# Patient Record
Sex: FEMALE | Race: WHITE | NOT HISPANIC OR LATINO | Employment: UNEMPLOYED | URBAN - NONMETROPOLITAN AREA
[De-identification: names, ages, dates, MRNs, and addresses within clinical notes are randomized per-mention and may not be internally consistent; named-entity substitution may affect disease eponyms.]

---
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
## 2024-09-23 ENCOUNTER — HOSPITAL ENCOUNTER (EMERGENCY)
Facility: HOSPITAL | Age: 19
Discharge: HOME | End: 2024-09-23
Attending: EMERGENCY MEDICINE | Admitting: EMERGENCY MEDICINE
Payer: COMMERCIAL

## 2024-09-23 ENCOUNTER — APPOINTMENT (OUTPATIENT)
Dept: GENERAL RADIOLOGY | Facility: HOSPITAL | Age: 19
End: 2024-09-23
Payer: COMMERCIAL

## 2024-09-23 VITALS
HEART RATE: 67 BPM | DIASTOLIC BLOOD PRESSURE: 77 MMHG | TEMPERATURE: 98 F | HEIGHT: 66 IN | SYSTOLIC BLOOD PRESSURE: 113 MMHG | OXYGEN SATURATION: 99 % | WEIGHT: 130 LBS | BODY MASS INDEX: 20.89 KG/M2 | RESPIRATION RATE: 14 BRPM

## 2024-09-23 DIAGNOSIS — R07.9 CHEST PAIN, UNSPECIFIED TYPE: Primary | ICD-10-CM

## 2024-09-23 LAB
ALBUMIN SERPL-MCNC: 4.7 G/DL (ref 3.5–5.2)
ALBUMIN/GLOB SERPL: 1.6 G/DL
ALP SERPL-CCNC: 107 U/L (ref 39–117)
ALT SERPL W P-5'-P-CCNC: 10 U/L (ref 1–33)
ANION GAP SERPL CALCULATED.3IONS-SCNC: 9.3 MMOL/L (ref 5–15)
AST SERPL-CCNC: 21 U/L (ref 1–32)
B-HCG UR QL: NEGATIVE
BASOPHILS # BLD AUTO: 0.09 10*3/MM3 (ref 0–0.2)
BASOPHILS NFR BLD AUTO: 0.9 % (ref 0–1.5)
BILIRUB SERPL-MCNC: 0.2 MG/DL (ref 0–1.2)
BILIRUB UR QL STRIP: NEGATIVE
BUN SERPL-MCNC: 13 MG/DL (ref 6–20)
BUN/CREAT SERPL: 17.3 (ref 7–25)
CALCIUM SPEC-SCNC: 9.5 MG/DL (ref 8.6–10.5)
CHLORIDE SERPL-SCNC: 101 MMOL/L (ref 98–107)
CLARITY UR: CLEAR
CO2 SERPL-SCNC: 28.7 MMOL/L (ref 22–29)
COLOR UR: YELLOW
CREAT SERPL-MCNC: 0.75 MG/DL (ref 0.57–1)
D DIMER PPP FEU-MCNC: <0.27 MCGFEU/ML (ref 0–0.5)
DEPRECATED RDW RBC AUTO: 39.9 FL (ref 37–54)
EGFRCR SERPLBLD CKD-EPI 2021: 117.1 ML/MIN/1.73
EOSINOPHIL # BLD AUTO: 0.06 10*3/MM3 (ref 0–0.4)
EOSINOPHIL NFR BLD AUTO: 0.6 % (ref 0.3–6.2)
ERYTHROCYTE [DISTWIDTH] IN BLOOD BY AUTOMATED COUNT: 12.3 % (ref 12.3–15.4)
GLOBULIN UR ELPH-MCNC: 2.9 GM/DL
GLUCOSE SERPL-MCNC: 142 MG/DL (ref 65–99)
GLUCOSE UR STRIP-MCNC: NEGATIVE MG/DL
HCT VFR BLD AUTO: 40.7 % (ref 34–46.6)
HGB BLD-MCNC: 13.7 G/DL (ref 12–15.9)
HGB UR QL STRIP.AUTO: NEGATIVE
IMM GRANULOCYTES # BLD AUTO: 0.03 10*3/MM3 (ref 0–0.05)
IMM GRANULOCYTES NFR BLD AUTO: 0.3 % (ref 0–0.5)
KETONES UR QL STRIP: NEGATIVE
LEUKOCYTE ESTERASE UR QL STRIP.AUTO: NEGATIVE
LYMPHOCYTES # BLD AUTO: 3.54 10*3/MM3 (ref 0.7–3.1)
LYMPHOCYTES NFR BLD AUTO: 35.4 % (ref 19.6–45.3)
MCH RBC QN AUTO: 29.7 PG (ref 26.6–33)
MCHC RBC AUTO-ENTMCNC: 33.7 G/DL (ref 31.5–35.7)
MCV RBC AUTO: 88.1 FL (ref 79–97)
MONOCYTES # BLD AUTO: 0.65 10*3/MM3 (ref 0.1–0.9)
MONOCYTES NFR BLD AUTO: 6.5 % (ref 5–12)
NEUTROPHILS NFR BLD AUTO: 5.64 10*3/MM3 (ref 1.7–7)
NEUTROPHILS NFR BLD AUTO: 56.3 % (ref 42.7–76)
NITRITE UR QL STRIP: NEGATIVE
NRBC BLD AUTO-RTO: 0 /100 WBC (ref 0–0.2)
PH UR STRIP.AUTO: 6.5 [PH] (ref 5–8)
PLATELET # BLD AUTO: 325 10*3/MM3 (ref 140–450)
PMV BLD AUTO: 10.1 FL (ref 6–12)
POTASSIUM SERPL-SCNC: 3.8 MMOL/L (ref 3.5–5.2)
PROT SERPL-MCNC: 7.6 G/DL (ref 6–8.5)
PROT UR QL STRIP: NEGATIVE
RBC # BLD AUTO: 4.62 10*6/MM3 (ref 3.77–5.28)
SODIUM SERPL-SCNC: 139 MMOL/L (ref 136–145)
SP GR UR STRIP: 1.02 (ref 1–1.03)
TROPONIN T SERPL HS-MCNC: <6 NG/L
UROBILINOGEN UR QL STRIP: NORMAL
WBC NRBC COR # BLD AUTO: 10.01 10*3/MM3 (ref 3.4–10.8)

## 2024-09-23 PROCEDURE — 96374 THER/PROPH/DIAG INJ IV PUSH: CPT

## 2024-09-23 PROCEDURE — 85379 FIBRIN DEGRADATION QUANT: CPT | Performed by: PHYSICIAN ASSISTANT

## 2024-09-23 PROCEDURE — 80053 COMPREHEN METABOLIC PANEL: CPT | Performed by: PHYSICIAN ASSISTANT

## 2024-09-23 PROCEDURE — 93005 ELECTROCARDIOGRAM TRACING: CPT | Performed by: PHYSICIAN ASSISTANT

## 2024-09-23 PROCEDURE — 84484 ASSAY OF TROPONIN QUANT: CPT | Performed by: PHYSICIAN ASSISTANT

## 2024-09-23 PROCEDURE — 25010000002 KETOROLAC TROMETHAMINE PER 15 MG: Performed by: PHYSICIAN ASSISTANT

## 2024-09-23 PROCEDURE — 81025 URINE PREGNANCY TEST: CPT | Performed by: PHYSICIAN ASSISTANT

## 2024-09-23 PROCEDURE — 81003 URINALYSIS AUTO W/O SCOPE: CPT | Performed by: PHYSICIAN ASSISTANT

## 2024-09-23 PROCEDURE — 99284 EMERGENCY DEPT VISIT MOD MDM: CPT

## 2024-09-23 PROCEDURE — 85025 COMPLETE CBC W/AUTO DIFF WBC: CPT | Performed by: PHYSICIAN ASSISTANT

## 2024-09-23 PROCEDURE — 71045 X-RAY EXAM CHEST 1 VIEW: CPT

## 2024-09-23 RX ORDER — KETOROLAC TROMETHAMINE 30 MG/ML
30 INJECTION, SOLUTION INTRAMUSCULAR; INTRAVENOUS ONCE
Status: COMPLETED | OUTPATIENT
Start: 2024-09-23 | End: 2024-09-23

## 2024-09-23 RX ORDER — SODIUM CHLORIDE 0.9 % (FLUSH) 0.9 %
10 SYRINGE (ML) INJECTION AS NEEDED
Status: DISCONTINUED | OUTPATIENT
Start: 2024-09-23 | End: 2024-09-23 | Stop reason: HOSPADM

## 2024-09-23 RX ADMIN — KETOROLAC TROMETHAMINE 30 MG: 30 INJECTION, SOLUTION INTRAMUSCULAR; INTRAVENOUS at 18:39

## 2025-02-22 ENCOUNTER — HOSPITAL ENCOUNTER (OUTPATIENT)
Facility: HOSPITAL | Age: 20
Discharge: HOME | End: 2025-02-22
Payer: COMMERCIAL

## 2025-02-22 ENCOUNTER — OFFICE VISIT (OUTPATIENT)
Age: 20
End: 2025-02-22
Payer: COMMERCIAL

## 2025-02-22 VITALS
SYSTOLIC BLOOD PRESSURE: 113 MMHG | HEART RATE: 105 BPM | HEIGHT: 66 IN | OXYGEN SATURATION: 98 % | DIASTOLIC BLOOD PRESSURE: 79 MMHG | WEIGHT: 126.8 LBS | BODY MASS INDEX: 20.38 KG/M2

## 2025-02-22 DIAGNOSIS — R11.0 NAUSEA: Primary | ICD-10-CM

## 2025-02-22 DIAGNOSIS — R68.89 INTOLERANCE TO COLD: ICD-10-CM

## 2025-02-22 DIAGNOSIS — R11.0 NAUSEA: ICD-10-CM

## 2025-02-22 DIAGNOSIS — R10.11 RUQ PAIN: ICD-10-CM

## 2025-02-22 LAB
ALBUMIN SERPL-MCNC: 4.5 G/DL (ref 3.4–4.8)
ALBUMIN/GLOB SERPL: 1.2 {RATIO} (ref 0.8–2)
ALP SERPL-CCNC: 435 U/L (ref 25–100)
ALT SERPL-CCNC: 747 U/L (ref 4–36)
ANION GAP SERPL CALCULATED.3IONS-SCNC: 12 MMOL/L (ref 3–16)
AST SERPL-CCNC: 496 U/L (ref 8–33)
BASOPHILS # BLD: 0.1 K/UL (ref 0–0.1)
BASOPHILS NFR BLD: 1.6 %
BILIRUB DIRECT SERPL-MCNC: 1.3 MG/DL (ref 0–0.2)
BILIRUB INDIRECT SERPL-MCNC: 0.6 MG/DL (ref 0.2–0.8)
BILIRUB SERPL-MCNC: 1.9 MG/DL (ref 0.3–1.2)
BILIRUBIN, POC: ABNORMAL
BLOOD URINE, POC: ABNORMAL
BUN SERPL-MCNC: 11 MG/DL (ref 6–20)
CALCIUM SERPL-MCNC: 10.1 MG/DL (ref 8.3–10.6)
CHLORIDE SERPL-SCNC: 101 MMOL/L (ref 98–107)
CLARITY, POC: ABNORMAL
CO2 SERPL-SCNC: 27 MMOL/L (ref 20–30)
COLOR, POC: ABNORMAL
CREAT SERPL-MCNC: 0.8 MG/DL (ref 0.4–1.2)
EOSINOPHIL # BLD: 0 K/UL (ref 0–0.4)
EOSINOPHIL NFR BLD: 0.3 %
ERYTHROCYTE [DISTWIDTH] IN BLOOD BY AUTOMATED COUNT: 13.2 % (ref 11–16)
ERYTHROCYTE [SEDIMENTATION RATE] IN BLOOD BY WESTERGREN METHOD: 4 MM/HR (ref 0–20)
GFR SERPLBLD CREATININE-BSD FMLA CKD-EPI: >90 ML/MIN/{1.73_M2}
GLOBULIN SER CALC-MCNC: 3.7 G/DL
GLUCOSE SERPL-MCNC: 126 MG/DL (ref 74–106)
GLUCOSE URINE, POC: ABNORMAL MG/DL
HCT VFR BLD AUTO: 46.1 % (ref 37–47)
HGB BLD-MCNC: 14.8 G/DL (ref 11.5–16.5)
IMM GRANULOCYTES # BLD: 0 K/UL
IMM GRANULOCYTES NFR BLD: 0.2 % (ref 0–5)
IRON SATN MFR SERPL: 37 % (ref 15–50)
IRON SERPL-MCNC: 126 UG/DL (ref 37–145)
KETONES, POC: ABNORMAL MG/DL
LEUKOCYTE EST, POC: ABNORMAL
LYMPHOCYTES # BLD: 6.2 K/UL (ref 1.5–4)
LYMPHOCYTES NFR BLD: 69.8 %
MCH RBC QN AUTO: 28.4 PG (ref 27–32)
MCHC RBC AUTO-ENTMCNC: 32.1 G/DL (ref 31–35)
MCV RBC AUTO: 88.3 FL (ref 80–100)
MONOCYTES # BLD: 0.5 K/UL (ref 0.2–0.8)
MONOCYTES NFR BLD: 5.9 %
NEUTROPHILS # BLD: 2 K/UL (ref 2–7.5)
NEUTS SEG NFR BLD: 22.2 %
NITRITE, POC: ABNORMAL
PH, POC: 6
PLATELET # BLD AUTO: 173 K/UL (ref 150–400)
PMV BLD AUTO: 10.3 FL (ref 6–10)
POTASSIUM SERPL-SCNC: 3.9 MMOL/L (ref 3.4–5.1)
PROT SERPL-MCNC: 8.2 G/DL (ref 6.4–8.3)
PROTEIN, POC: ABNORMAL MG/DL
RBC # BLD AUTO: 5.22 M/UL (ref 3.8–5.8)
SODIUM SERPL-SCNC: 140 MMOL/L (ref 136–145)
SPECIFIC GRAVITY, POC: 1.01
T4 FREE SERPL-MCNC: 1.3 NG/DL (ref 0.92–1.68)
TIBC SERPL-MCNC: 342 UG/DL (ref 250–450)
TSH SERPL DL<=0.005 MIU/L-ACNC: 0.95 UIU/ML (ref 0.27–4.2)
UROBILINOGEN, POC: ABNORMAL MG/DL
WBC # BLD AUTO: 8.9 K/UL (ref 4–11)

## 2025-02-22 PROCEDURE — 83540 ASSAY OF IRON: CPT

## 2025-02-22 PROCEDURE — 85652 RBC SED RATE AUTOMATED: CPT

## 2025-02-22 PROCEDURE — 81002 URINALYSIS NONAUTO W/O SCOPE: CPT

## 2025-02-22 PROCEDURE — 36415 COLL VENOUS BLD VENIPUNCTURE: CPT

## 2025-02-22 PROCEDURE — 84439 ASSAY OF FREE THYROXINE: CPT

## 2025-02-22 PROCEDURE — 85025 COMPLETE CBC W/AUTO DIFF WBC: CPT

## 2025-02-22 PROCEDURE — 84443 ASSAY THYROID STIM HORMONE: CPT

## 2025-02-22 PROCEDURE — 80053 COMPREHEN METABOLIC PANEL: CPT

## 2025-02-22 PROCEDURE — 83550 IRON BINDING TEST: CPT

## 2025-02-22 PROCEDURE — 82248 BILIRUBIN DIRECT: CPT

## 2025-02-22 ASSESSMENT — ENCOUNTER SYMPTOMS
NAUSEA: 1
DIARRHEA: 0
ABDOMINAL PAIN: 0
CONSTIPATION: 0

## 2025-02-22 ASSESSMENT — PATIENT HEALTH QUESTIONNAIRE - PHQ9: DEPRESSION UNABLE TO ASSESS: URGENT/EMERGENT SITUATION

## 2025-02-22 NOTE — PROGRESS NOTES
No bruising.      Comments: 1 cm cyst on back of head, no drainage.   Neurological:      General: No focal deficit present.      Mental Status: She is alert and oriented to person, place, and time. Mental status is at baseline.   Psychiatric:         Mood and Affect: Mood normal.         Behavior: Behavior normal.           Assessment:      1. Nausea  -     CBC with Auto Differential; Future  -     Comprehensive Metabolic Panel; Future  -     Iron and TIBC; Future  -     Hepatic Function Panel; Future  -     Sedimentation Rate; Future  -     US GALLBLADDER RUQ; Future  -     POCT Urinalysis no Micro  2. Intolerance to cold  -     TSH; Future  -     T4, Free; Future  3. RUQ pain  -     US GALLBLADDER RUQ; Future         Plan:      - Lab orders placed.  - Urine dip in office. Increased Urobili and bilirubin levels in urine.  - Order for RUQ ultrasound.  - Pt is to follow up in 1 week or after ultrasound is completed to review results and lab results.  - Return to office or go to ED if s.s persist or worsen. Pt verbalizes understanding and is in agreement with POC.    Orders Placed This Encounter   Procedures    US GALLBLADDER RUQ     [facility]      Standing Status:   Future     Standing Expiration Date:   2/22/2026    TSH     Standing Status:   Future     Standing Expiration Date:   2/22/2026    T4, Free     Standing Status:   Future     Standing Expiration Date:   2/22/2026    CBC with Auto Differential     Standing Status:   Future     Standing Expiration Date:   2/22/2026    Comprehensive Metabolic Panel     Standing Status:   Future     Standing Expiration Date:   2/22/2026    Iron and TIBC     Standing Status:   Future     Standing Expiration Date:   2/22/2026    Hepatic Function Panel     Standing Status:   Future     Standing Expiration Date:   2/22/2026    Sedimentation Rate     Standing Status:   Future     Standing Expiration Date:   2/22/2026    POCT Urinalysis no Micro

## 2025-02-23 ENCOUNTER — APPOINTMENT (OUTPATIENT)
Dept: CT IMAGING | Facility: HOSPITAL | Age: 20
End: 2025-02-23
Payer: COMMERCIAL

## 2025-02-23 ENCOUNTER — HOSPITAL ENCOUNTER (OUTPATIENT)
Facility: HOSPITAL | Age: 20
Setting detail: OBSERVATION
Discharge: HOME | End: 2025-02-25
Attending: STUDENT IN AN ORGANIZED HEALTH CARE EDUCATION/TRAINING PROGRAM | Admitting: INTERNAL MEDICINE
Payer: COMMERCIAL

## 2025-02-23 DIAGNOSIS — E80.6 HYPERBILIRUBINEMIA: Primary | ICD-10-CM

## 2025-02-23 LAB
ALBUMIN SERPL-MCNC: 4.8 G/DL (ref 3.5–5.2)
ALBUMIN/GLOB SERPL: 1.5 G/DL
ALP SERPL-CCNC: 502 U/L (ref 39–117)
ALT SERPL W P-5'-P-CCNC: 610 U/L (ref 1–33)
ANION GAP SERPL CALCULATED.3IONS-SCNC: 10.8 MMOL/L (ref 5–15)
APAP SERPL-MCNC: <5 MCG/ML (ref 0–30)
AST SERPL-CCNC: 394 U/L (ref 1–32)
BASOPHILS # BLD MANUAL: 0.08 10*3/MM3 (ref 0–0.2)
BASOPHILS NFR BLD MANUAL: 1 % (ref 0–1.5)
BILIRUB SERPL-MCNC: 1.3 MG/DL (ref 0–1.2)
BUN SERPL-MCNC: 7 MG/DL (ref 6–20)
BUN/CREAT SERPL: 8.2 (ref 7–25)
CALCIUM SPEC-SCNC: 9.2 MG/DL (ref 8.6–10.5)
CHLORIDE SERPL-SCNC: 101 MMOL/L (ref 98–107)
CO2 SERPL-SCNC: 28.2 MMOL/L (ref 22–29)
CREAT SERPL-MCNC: 0.85 MG/DL (ref 0.57–1)
DEPRECATED RDW RBC AUTO: 42.7 FL (ref 37–54)
EGFRCR SERPLBLD CKD-EPI 2021: 100.1 ML/MIN/1.73
ERYTHROCYTE [DISTWIDTH] IN BLOOD BY AUTOMATED COUNT: 13.2 % (ref 12.3–15.4)
FLUAV RNA RESP QL NAA+PROBE: NOT DETECTED
FLUBV RNA RESP QL NAA+PROBE: NOT DETECTED
GLOBULIN UR ELPH-MCNC: 3.3 GM/DL
GLUCOSE SERPL-MCNC: 114 MG/DL (ref 65–99)
HCG INTACT+B SERPL-ACNC: <0.1 MIU/ML
HCT VFR BLD AUTO: 44.3 % (ref 34–46.6)
HGB BLD-MCNC: 14.7 G/DL (ref 12–15.9)
HOLD SPECIMEN: NORMAL
HOLD SPECIMEN: NORMAL
INR PPP: 0.96 (ref 0.9–1.1)
LIPASE SERPL-CCNC: 81 U/L (ref 13–60)
LYMPHOCYTES # BLD MANUAL: 7.59 10*3/MM3 (ref 0.7–3.1)
LYMPHOCYTES NFR BLD MANUAL: 6 % (ref 5–12)
MCH RBC QN AUTO: 29.2 PG (ref 26.6–33)
MCHC RBC AUTO-ENTMCNC: 33.2 G/DL (ref 31.5–35.7)
MCV RBC AUTO: 87.9 FL (ref 79–97)
MONOCYTES # BLD: 0.5 10*3/MM3 (ref 0.1–0.9)
NEUTROPHILS # BLD AUTO: 0.08 10*3/MM3 (ref 1.7–7)
NEUTROPHILS NFR BLD MANUAL: 1 % (ref 42.7–76)
PLAT MORPH BLD: NORMAL
PLATELET # BLD AUTO: 209 10*3/MM3 (ref 140–450)
PMV BLD AUTO: 10.3 FL (ref 6–12)
POTASSIUM SERPL-SCNC: 3.7 MMOL/L (ref 3.5–5.2)
PROT SERPL-MCNC: 8.1 G/DL (ref 6–8.5)
PROTHROMBIN TIME: 13.3 SECONDS (ref 12.3–15.1)
RBC # BLD AUTO: 5.04 10*6/MM3 (ref 3.77–5.28)
RBC MORPH BLD: NORMAL
SARS-COV-2 RNA RESP QL NAA+PROBE: NOT DETECTED
SODIUM SERPL-SCNC: 140 MMOL/L (ref 136–145)
VARIANT LYMPHS NFR BLD MANUAL: 24 % (ref 19.6–45.3)
VARIANT LYMPHS NFR BLD MANUAL: 68 % (ref 0–5)
WBC MORPH BLD: NORMAL
WBC NRBC COR # BLD AUTO: 8.25 10*3/MM3 (ref 3.4–10.8)
WHOLE BLOOD HOLD COAG: NORMAL
WHOLE BLOOD HOLD SPECIMEN: NORMAL

## 2025-02-23 PROCEDURE — 85007 BL SMEAR W/DIFF WBC COUNT: CPT | Performed by: NURSE PRACTITIONER

## 2025-02-23 PROCEDURE — G0378 HOSPITAL OBSERVATION PER HR: HCPCS

## 2025-02-23 PROCEDURE — 80074 ACUTE HEPATITIS PANEL: CPT | Performed by: STUDENT IN AN ORGANIZED HEALTH CARE EDUCATION/TRAINING PROGRAM

## 2025-02-23 PROCEDURE — 86665 EPSTEIN-BARR CAPSID VCA: CPT | Performed by: STUDENT IN AN ORGANIZED HEALTH CARE EDUCATION/TRAINING PROGRAM

## 2025-02-23 PROCEDURE — 84702 CHORIONIC GONADOTROPIN TEST: CPT | Performed by: NURSE PRACTITIONER

## 2025-02-23 PROCEDURE — 80143 DRUG ASSAY ACETAMINOPHEN: CPT | Performed by: NURSE PRACTITIONER

## 2025-02-23 PROCEDURE — 83690 ASSAY OF LIPASE: CPT | Performed by: NURSE PRACTITIONER

## 2025-02-23 PROCEDURE — 87636 SARSCOV2 & INF A&B AMP PRB: CPT | Performed by: NURSE PRACTITIONER

## 2025-02-23 PROCEDURE — 85025 COMPLETE CBC W/AUTO DIFF WBC: CPT | Performed by: NURSE PRACTITIONER

## 2025-02-23 PROCEDURE — 74177 CT ABD & PELVIS W/CONTRAST: CPT

## 2025-02-23 PROCEDURE — 86664 EPSTEIN-BARR NUCLEAR ANTIGEN: CPT | Performed by: STUDENT IN AN ORGANIZED HEALTH CARE EDUCATION/TRAINING PROGRAM

## 2025-02-23 PROCEDURE — 25510000001 IOPAMIDOL 61 % SOLUTION: Performed by: STUDENT IN AN ORGANIZED HEALTH CARE EDUCATION/TRAINING PROGRAM

## 2025-02-23 PROCEDURE — 86381 MITOCHONDRIAL ANTIBODY EACH: CPT | Performed by: STUDENT IN AN ORGANIZED HEALTH CARE EDUCATION/TRAINING PROGRAM

## 2025-02-23 PROCEDURE — 85610 PROTHROMBIN TIME: CPT | Performed by: STUDENT IN AN ORGANIZED HEALTH CARE EDUCATION/TRAINING PROGRAM

## 2025-02-23 PROCEDURE — 99285 EMERGENCY DEPT VISIT HI MDM: CPT | Performed by: STUDENT IN AN ORGANIZED HEALTH CARE EDUCATION/TRAINING PROGRAM

## 2025-02-23 PROCEDURE — 80053 COMPREHEN METABOLIC PANEL: CPT | Performed by: NURSE PRACTITIONER

## 2025-02-23 RX ORDER — SODIUM CHLORIDE 0.9 % (FLUSH) 0.9 %
10 SYRINGE (ML) INJECTION EVERY 12 HOURS SCHEDULED
Status: DISCONTINUED | OUTPATIENT
Start: 2025-02-23 | End: 2025-02-25 | Stop reason: HOSPADM

## 2025-02-23 RX ORDER — ONDANSETRON 2 MG/ML
4 INJECTION INTRAMUSCULAR; INTRAVENOUS EVERY 6 HOURS PRN
Status: DISCONTINUED | OUTPATIENT
Start: 2025-02-23 | End: 2025-02-25 | Stop reason: HOSPADM

## 2025-02-23 RX ORDER — SODIUM CHLORIDE 0.9 % (FLUSH) 0.9 %
10 SYRINGE (ML) INJECTION AS NEEDED
Status: DISCONTINUED | OUTPATIENT
Start: 2025-02-23 | End: 2025-02-25 | Stop reason: HOSPADM

## 2025-02-23 RX ORDER — IOPAMIDOL 612 MG/ML
100 INJECTION, SOLUTION INTRAVASCULAR
Status: COMPLETED | OUTPATIENT
Start: 2025-02-23 | End: 2025-02-23

## 2025-02-23 RX ORDER — ONDANSETRON 2 MG/ML
4 INJECTION INTRAMUSCULAR; INTRAVENOUS EVERY 6 HOURS PRN
Status: DISCONTINUED | OUTPATIENT
Start: 2025-02-23 | End: 2025-02-24

## 2025-02-23 RX ORDER — SODIUM CHLORIDE 9 MG/ML
40 INJECTION, SOLUTION INTRAVENOUS AS NEEDED
Status: DISCONTINUED | OUTPATIENT
Start: 2025-02-23 | End: 2025-02-25 | Stop reason: HOSPADM

## 2025-02-23 RX ORDER — SODIUM CHLORIDE 9 MG/ML
75 INJECTION, SOLUTION INTRAVENOUS CONTINUOUS
Status: DISCONTINUED | OUTPATIENT
Start: 2025-02-23 | End: 2025-02-25 | Stop reason: HOSPADM

## 2025-02-23 RX ADMIN — IOPAMIDOL 80 ML: 612 INJECTION, SOLUTION INTRAVENOUS at 21:31

## 2025-02-23 NOTE — LETTER
February 25, 2025     Patient: Marisol Suh   YOB: 2005   Date of Visit: 2/23/2025       To Whom it May Concern:    Marisol Suh was admitted to [facility] on 2/23/2025. She may return to school and work on Monday March 3rd, 2025.     If you have any questions or concerns, please don't hesitate to call.         Sincerely,          Marissa Ascencio RN

## 2025-02-24 ENCOUNTER — APPOINTMENT (OUTPATIENT)
Dept: ULTRASOUND IMAGING | Facility: HOSPITAL | Age: 20
End: 2025-02-24
Payer: COMMERCIAL

## 2025-02-24 ENCOUNTER — APPOINTMENT (OUTPATIENT)
Dept: MRI IMAGING | Facility: HOSPITAL | Age: 20
End: 2025-02-24
Payer: COMMERCIAL

## 2025-02-24 DIAGNOSIS — R74.8 ELEVATED LIVER ENZYMES: Primary | ICD-10-CM

## 2025-02-24 LAB
ALBUMIN SERPL-MCNC: 3.9 G/DL (ref 3.5–5.2)
ALBUMIN/GLOB SERPL: 1.3 G/DL
ALP SERPL-CCNC: 417 U/L (ref 39–117)
ALT SERPL W P-5'-P-CCNC: 498 U/L (ref 1–33)
ANION GAP SERPL CALCULATED.3IONS-SCNC: 10.1 MMOL/L (ref 5–15)
AST SERPL-CCNC: 285 U/L (ref 1–32)
BASOPHILS # BLD MANUAL: 0.07 10*3/MM3 (ref 0–0.2)
BASOPHILS NFR BLD MANUAL: 1 % (ref 0–1.5)
BILIRUB SERPL-MCNC: 0.8 MG/DL (ref 0–1.2)
BUN SERPL-MCNC: 6 MG/DL (ref 6–20)
BUN/CREAT SERPL: 9.4 (ref 7–25)
CALCIUM SPEC-SCNC: 8.9 MG/DL (ref 8.6–10.5)
CHLORIDE SERPL-SCNC: 105 MMOL/L (ref 98–107)
CO2 SERPL-SCNC: 25.9 MMOL/L (ref 22–29)
CREAT SERPL-MCNC: 0.64 MG/DL (ref 0.57–1)
DEPRECATED RDW RBC AUTO: 42.6 FL (ref 37–54)
EGFRCR SERPLBLD CKD-EPI 2021: 129.1 ML/MIN/1.73
ERYTHROCYTE [DISTWIDTH] IN BLOOD BY AUTOMATED COUNT: 13.2 % (ref 12.3–15.4)
GLOBULIN UR ELPH-MCNC: 3.1 GM/DL
GLUCOSE SERPL-MCNC: 91 MG/DL (ref 65–99)
HAV IGM SERPL QL IA: NORMAL
HBV CORE IGM SERPL QL IA: NORMAL
HBV SURFACE AG SERPL QL IA: NORMAL
HCT VFR BLD AUTO: 39.1 % (ref 34–46.6)
HCV AB SER QL: NORMAL
HETEROPH AB SER QL LA: POSITIVE
HGB BLD-MCNC: 13.1 G/DL (ref 12–15.9)
HYPOCHROMIA BLD QL: ABNORMAL
LYMPHOCYTES # BLD MANUAL: 6.24 10*3/MM3 (ref 0.7–3.1)
LYMPHOCYTES NFR BLD MANUAL: 5 % (ref 5–12)
MCH RBC QN AUTO: 29.3 PG (ref 26.6–33)
MCHC RBC AUTO-ENTMCNC: 33.5 G/DL (ref 31.5–35.7)
MCV RBC AUTO: 87.5 FL (ref 79–97)
MONOCYTES # BLD: 0.36 10*3/MM3 (ref 0.1–0.9)
NEUTROPHILS # BLD AUTO: 0.5 10*3/MM3 (ref 1.7–7)
NEUTROPHILS NFR BLD MANUAL: 7 % (ref 42.7–76)
PLATELET # BLD AUTO: 191 10*3/MM3 (ref 140–450)
PMV BLD AUTO: 10.2 FL (ref 6–12)
POTASSIUM SERPL-SCNC: 4.2 MMOL/L (ref 3.5–5.2)
PROT SERPL-MCNC: 7 G/DL (ref 6–8.5)
RBC # BLD AUTO: 4.47 10*6/MM3 (ref 3.77–5.28)
SCAN SLIDE: NORMAL
SMALL PLATELETS BLD QL SMEAR: ADEQUATE
SODIUM SERPL-SCNC: 141 MMOL/L (ref 136–145)
STOMATOCYTES BLD QL SMEAR: ABNORMAL
VARIANT LYMPHS NFR BLD MANUAL: 42 % (ref 19.6–45.3)
VARIANT LYMPHS NFR BLD MANUAL: 45 % (ref 0–5)
WBC MORPH BLD: NORMAL
WBC NRBC COR # BLD AUTO: 7.17 10*3/MM3 (ref 3.4–10.8)

## 2025-02-24 PROCEDURE — 76705 ECHO EXAM OF ABDOMEN: CPT

## 2025-02-24 PROCEDURE — G0378 HOSPITAL OBSERVATION PER HR: HCPCS

## 2025-02-24 PROCEDURE — 96361 HYDRATE IV INFUSION ADD-ON: CPT

## 2025-02-24 PROCEDURE — 99204 OFFICE O/P NEW MOD 45 MIN: CPT | Performed by: INTERNAL MEDICINE

## 2025-02-24 PROCEDURE — 80053 COMPREHEN METABOLIC PANEL: CPT | Performed by: STUDENT IN AN ORGANIZED HEALTH CARE EDUCATION/TRAINING PROGRAM

## 2025-02-24 PROCEDURE — 85025 COMPLETE CBC W/AUTO DIFF WBC: CPT | Performed by: STUDENT IN AN ORGANIZED HEALTH CARE EDUCATION/TRAINING PROGRAM

## 2025-02-24 PROCEDURE — 74181 MRI ABDOMEN W/O CONTRAST: CPT

## 2025-02-24 PROCEDURE — 25810000003 SODIUM CHLORIDE 0.9 % SOLUTION: Performed by: STUDENT IN AN ORGANIZED HEALTH CARE EDUCATION/TRAINING PROGRAM

## 2025-02-24 PROCEDURE — 86015 ACTIN ANTIBODY EACH: CPT | Performed by: STUDENT IN AN ORGANIZED HEALTH CARE EDUCATION/TRAINING PROGRAM

## 2025-02-24 PROCEDURE — 86644 CMV ANTIBODY: CPT | Performed by: STUDENT IN AN ORGANIZED HEALTH CARE EDUCATION/TRAINING PROGRAM

## 2025-02-24 PROCEDURE — 86038 ANTINUCLEAR ANTIBODIES: CPT | Performed by: STUDENT IN AN ORGANIZED HEALTH CARE EDUCATION/TRAINING PROGRAM

## 2025-02-24 PROCEDURE — 86364 TISS TRNSGLTMNASE EA IG CLAS: CPT | Performed by: STUDENT IN AN ORGANIZED HEALTH CARE EDUCATION/TRAINING PROGRAM

## 2025-02-24 PROCEDURE — 86308 HETEROPHILE ANTIBODY SCREEN: CPT | Performed by: NURSE PRACTITIONER

## 2025-02-24 PROCEDURE — 96360 HYDRATION IV INFUSION INIT: CPT

## 2025-02-24 PROCEDURE — 86376 MICROSOMAL ANTIBODY EACH: CPT | Performed by: STUDENT IN AN ORGANIZED HEALTH CARE EDUCATION/TRAINING PROGRAM

## 2025-02-24 PROCEDURE — 86645 CMV ANTIBODY IGM: CPT | Performed by: STUDENT IN AN ORGANIZED HEALTH CARE EDUCATION/TRAINING PROGRAM

## 2025-02-24 PROCEDURE — 99232 SBSQ HOSP IP/OBS MODERATE 35: CPT | Performed by: NURSE PRACTITIONER

## 2025-02-24 RX ADMIN — SODIUM CHLORIDE 75 ML/HR: 9 INJECTION, SOLUTION INTRAVENOUS at 06:04

## 2025-02-24 RX ADMIN — SODIUM CHLORIDE 75 ML/HR: 9 INJECTION, SOLUTION INTRAVENOUS at 20:46

## 2025-02-24 NOTE — PROGRESS NOTES
"Dietitian Assessment    Patient Name: Marisol Suh  YOB: 2005  MRN: [id number]  Admission date: 2/23/2025    Comment:      Clinical Nutrition Assessment      Reason for Assessment MST score 2+   H&P  History reviewed. No pertinent past medical history.    History reviewed. No pertinent surgical history.         Current Problems   Patient Active Problem List   Diagnosis    Hyperbilirubinemia        Encounter Information        Trending Narrative     2/24: Pt w/ MST score of 3 - unsure of recent weight loss per chart review. Per patient's H&P she has had 3 months of decreased appetite w/ N/V after eating. Pt currently NPO - will order supplementation once diet advances.      Anthropometrics        Current Height, Weight Height: 167.6 cm (66\")  Weight: 55.9 kg (123 lb 3.8 oz) (02/24/25 0104)   Trending Weight Hx     This admission:              PTA:     Wt Readings from Last 30 Encounters:   02/24/25 0104 55.9 kg (123 lb 3.8 oz)   02/23/25 1947 57.2 kg (126 lb)   09/23/24 1603 59 kg (130 lb)   07/13/24 1214 61.2 kg (135 lb)   05/11/24 1510 61.2 kg (135 lb)   10/19/23 1857 61.2 kg (135 lb) (62%, Z= 0.31)*   12/30/21 0955 61.7 kg (136 lb) (71%, Z= 0.55)*   04/28/17 0906 49 kg (108 lb) (60%, Z= 0.24)*     * Growth percentiles are based on CDC (Girls, 2-20 Years) data.      BMI kg/m2 Body mass index is 19.89 kg/m².     Labs        Pertinent Labs     Results from last 7 days   Lab Units 02/24/25  0654 02/23/25 2003 02/22/25  1350   SODIUM mmol/L 141 140  --    POTASSIUM mmol/L 4.2 3.7  --    CHLORIDE mmol/L 105 101  --    CO2 mmol/L 25.9 28.2  --    BUN mg/dL 6 7  --    CREATININE mg/dL 0.64 0.85  --    CALCIUM mg/dL 8.9 9.2  --    BILIRUBIN mg/dL 0.8 1.3* 1.9*   ALK PHOS U/L 417* 502* 435*   ALT (SGPT) U/L 498* 610* 747*   AST (SGOT) U/L 285* 394* 496*   GLUCOSE mg/dL 91 114*  --        Results from last 7 days   Lab Units 02/24/25  0654   HEMOGLOBIN g/dL 13.1   HEMATOCRIT % 39.1       No results found for: " "\"HGBA1C\"         Medications       Scheduled Medications sodium chloride, 10 mL, Intravenous, Q12H        Infusions sodium chloride, 75 mL/hr, Last Rate: 75 mL/hr (02/24/25 1010)         PRN Medications   Calcium Replacement - Follow Nurse / BPA Driven Protocol    Magnesium Standard Dose Replacement - Follow Nurse / BPA Driven Protocol    ondansetron    Phosphorus Replacement - Follow Nurse / BPA Driven Protocol    Potassium Replacement - Follow Nurse / BPA Driven Protocol    [COMPLETED] Insert Peripheral IV **AND** sodium chloride    sodium chloride    sodium chloride     Physical Findings        Trending Physical   Appearance, NFPE    --  Edema  no edema   Bowel Function None   Tubes none and Central Line/PICC   Chewing/Swallowing NPO   Skin Intact     Estimated/Assessed Needs       Energy Requirements    EST Needs, Method, Wt used 1397-1677kcals/day     25-30 kcal/kg   Protein Requirements    EST Needs, Method, Wt used 55g protein per day    1.0 gm/kg   Fluid Requirements     Estimated Needs (mL/day) 1677mL per day    1 mL/kcal       Current Nutrition Orders & Evaluation of Intake       Oral Nutrition     Food Allergies    Current PO Diet NPO Diet NPO Type: Sips with Meds   Supplement    PO Evaluation     Trending % PO Intake NPO     Enteral Nutrition    Enteral Route    Order, Modulars, Flushes    Residual/Tolerance    TF Observation         Parenteral Nutrition     TPN Route    Total # Days on TPN    TPN Order, Lipid Details    MVI & Trace Element Freq    TPN Observation       Nutrition Diagnosis         Nutrition Dx Problem 1 Unintentional weight loss d/t decreased appetite as evidenced by 15lb weight loss w/in 12 months    Nutrition Dx Problem 2        Intervention Goal         Intervention Goal(s) Diet advancement when/if medically appropriate  Maintain current body weight       Nutrition Intervention        RD Action Await initiation/advancement of PO diet and Continue to monitor     Nutrition Prescription  "         Diet Prescription NPO Diet NPO Type: Sips with Meds   Supplement Prescription      Enteral Prescription        TPN Prescription      Monitor/Evaluation        Monitor Per protocol, PO intake, Pertinent labs, Weight, Skin status, GI status, Symptoms, Swallow function, Hemodynamic stability     RD to follow-up.    Electronically signed by:  Danna Tavares RD  02/24/25 11:07 EST

## 2025-02-24 NOTE — PROGRESS NOTES
[facility] HOSPITALIST    PROGRESS NOTE    Name:  Marisol Suh   Age:  20 y.o.  Sex:  female  :  2005  MRN:  [id number]   Visit Number:  [id number]  Admission Date:  2025  Date Of Service:  25  Primary Care Physician:  Provider, No Known     LOS: 0 days :    Chief Complaint:      Fatigue/abnormal labs/nausea vomiting    Subjective:    Patient seen and examined with mom at bedside.  Able to eat and drink with no nausea.  Advised MRCP and ultrasound negative.  Labs are downtrending.  GI to see today.  She does have history of mono in the past.    Hospital Course:    Marisol Suh is a 20-year-old woman with past medical history of menorrhagia.  Presented to [facility] ED on 2025 at direction of PCP for abnormal labs including transaminitis and hyperbilirubinemia.  She saw PCP day prior to presentation with concern for 3 months decreased appetite with some nausea and vomiting especially after eating, 15 pound weight loss over the last 12 months. She has heavy periods, regular each month last about 5 days.  She switched her birth control summer , denied any change in symptoms at that time.  Recently diagnosed over the past month with COVID and strep.     ED summary: Afebrile, vital signs stable on room air.  Blood glucose 114, alk phos 502, , , bilirubin 1.3.  Lipase 81.  hCG negative.  CBC unremarkable.  COVID/flu negative.  Acetaminophen negative.  CT ab/pelvis no acute findings.  CT ab/pelvis small complex 5 mm right hepatic hypodensity could reflect a hemangioma.  Hospitalist consulted for further medical management.  GI consulted as well.  LFTs and total bilirubin downtrending.  MRCP with subcentimeter right lobe hepatic lesion likely representing a cyst.  Ultrasound right upper quadrant unremarkable.    Review of Systems:     All systems were reviewed and negative except as mentioned in subjective, assessment and plan.    Vital Signs:    Temp:  [97.3 °F (36.3  "°C)-98.5 °F (36.9 °C)] 98.2 °F (36.8 °C)  Heart Rate:  [62-77] 71  Resp:  [16-18] 16  BP: (105-124)/(67-82) 116/76    Intake and output:    No intake/output data recorded.  No intake/output data recorded.    Physical Examination:    General Appearance:  Alert and cooperative.  Well-appearing young adult female.   Head:  Atraumatic and normocephalic.   Eyes: Conjunctivae and sclerae normal, no icterus. No pallor.   Throat: No oral lesions, no thrush, oral mucosa moist.   Neck: Supple, trachea midline, no thyromegaly.   Lungs:   Breath sounds heard bilaterally equally.  No wheezing or crackles. No Pleural rub or bronchial breathing.  Unlabored on room air.   Heart:  Normal S1 and S2, no murmur, no gallop, no rub. No JVD.   Abdomen:   Normal bowel sounds, no masses, no organomegaly. Soft, nontender, nondistended, no rebound tenderness.  No tenderness noted with deep palpation.   Extremities: Supple, no edema, no cyanosis, no clubbing.   Skin: No bleeding or rash.   Neurologic: Alert and oriented x 3. No facial asymmetry. Moves all four limbs. No tremors.      Laboratory results:    Results from last 7 days   Lab Units 02/24/25 0654 02/23/25 2003 02/22/25  1350   SODIUM mmol/L 141 140  --    POTASSIUM mmol/L 4.2 3.7  --    CHLORIDE mmol/L 105 101  --    CO2 mmol/L 25.9 28.2  --    BUN mg/dL 6 7  --    CREATININE mg/dL 0.64 0.85  --    CALCIUM mg/dL 8.9 9.2  --    BILIRUBIN mg/dL 0.8 1.3* 1.9*   ALK PHOS U/L 417* 502* 435*   ALT (SGPT) U/L 498* 610* 747*   AST (SGOT) U/L 285* 394* 496*   GLUCOSE mg/dL 91 114*  --      Results from last 7 days   Lab Units 02/24/25  0654 02/23/25 2003 02/22/25  1350   WBC 10*3/mm3 7.17 8.25 8.9   HEMOGLOBIN g/dL 13.1 14.7 14.8   HEMATOCRIT % 39.1 44.3 46.1   PLATELETS 10*3/mm3 191 209 173     Results from last 7 days   Lab Units 02/23/25  2326   INR  0.96             No results for input(s): \"PHART\", \"FGG6UBD\", \"PO2ART\", \"EVR6XNL\", \"BASEEXCESS\" in the last 8760 hours.   I have reviewed " the patient's laboratory results.    Radiology results:    MRI abdomen wo contrast mrcp    Result Date: 2/24/2025  MRCP  HISTORY: Abdominal pain.  PROCEDURE: MRI images of the abdomen were performed. An MRCP was also performed. 3 D Reconstruction images were also performed.  FINDINGS: Axial localization images are limited by mild to moderate motion artifact. There is subcentimeter hypodense lesion identified laterally in the right lobe of the liver on the CT earlier the same day demonstrates increased T2 and decreased T1 signal, likely cyst. Hemangiomas in the differential as well. The gallbladder is unremarkable. The biliary tree is proper caliber. No definite filling defects are identified. Limited images of the pancreatic duct are unremarkable. Stomach is mildly distended with fluid and debris.      Impression: Unremarkable MRCP.  Suboptimal exam secondary to motion artifact.  Subcentimeter right lobe hepatic lesion likely representing a cyst as described.  This report was signed and finalized on 2/24/2025 9:34 AM by Rosette Hall MD.      US Abdomen Limited    Result Date: 2/24/2025  RIGHT UPPER QUADRANT ULTRASOUND  HISTORY: Transaminitis, hyperbilirubinemia; E80.6-Other disorders of bilirubin metabolism  COMPARISON: None.  PROCEDURE: Ultrasound images of the right upper quadrant were obtained.  FINDINGS:  The liver parenchyma is of proper echogenicity.  There is no focal abnormality. The gallbladder is proper size with no wall thickening or pericholecystic fluid. There are  no gallstones. No evidence of ductal dilatation is identified. Limited images of the pancreas are  obscured by bowel gas. Right kidney measures 9.5 cm and appears normal. [  ]      Impression: Unremarkable right upper quadrant ultrasound.    This report was signed and finalized on 2/24/2025 8:39 AM by Rosette Hall MD.      CT Abdomen Pelvis With Contrast    Result Date: 2/23/2025  FINAL REPORT TECHNIQUE: null CLINICAL HISTORY: Nausea and  vomiting, elevated liver enzyme COMPARISON: null FINDINGS: CT abdomen and pelvis with contrast Comparison: None Findings: No consolidation or effusion. Small complex 5 mm right hepatic hypodensity could reflect a hemangioma. Gallbladder is unremarkable. No biliary dilatation. The portal and splenic veins appear patent. The spleen, pancreas, and adrenal glands are unremarkable. Both kidneys enhance symmetrically without hydroureteronephrosis. No bowel obstruction, pneumoperitoneum, or pneumatosis. The appendix is normal. Nonaneurysmal aorta. No enlarged abdominal or pelvic lymph nodes. The urinary bladder and uterus are unremarkable. Simple pelvic free fluid likely physiologic. There are no aggressive osseous lesions.     Impression: IMPRESSION: No acute findings. Appendix is normal. Authenticated and Electronically Signed by Delvin Moore MD on 02/23/2025 10:18:57 PM   I have reviewed the patient's radiology reports.    Medication Review:     I have reviewed the patient's active and prn medications.     Problem List:      Hyperbilirubinemia      Assessment:    Transaminitis  Hyperbilirubinemia    Plan:    -LFTs/bilirubin downtrending.  -MRCP negative.  -Ultrasound negative.  -Labs pending-CMV, EBV, antimitochondrial antibody, antimicrosomal, anti-smooth muscle, antinuclear, ttG.  -Monospot positive however has had mono in the past.  -GI following.  Appreciate further recommendations.    I have reviewed the copied text and it is accurate as of 2/24/2025     DVT Prophylaxis: SCDS  Code Status: Full code  Diet: GI soft  Discharge Plan: Likely home in 1-2 days.    Melba Leyva, APRN  02/24/25  15:53 EST    Dictated utilizing Dragon dictation.

## 2025-02-24 NOTE — CASE MANAGEMENT/SOCIAL WORK
Discharge Planning Assessment  [facility]     Patient Name: Marisol Suh  MRN: [id number]  Today's Date: 2/24/2025    Admit Date: 2/23/2025     DCP; Home with family.   Discharge Needs Assessment       Row Name 02/24/25 1142       Living Environment    People in Home parent(s)    Name(s) of People in Home mother Bhargavi and  father Manuel    Current Living Arrangements home    Duration at Residence 21 years    Potentially Unsafe Housing Conditions none    In the past 12 months has the electric, gas, oil, or water company threatened to shut off services in your home? No    Primary Care Provided by self    Provides Primary Care For no one    Family Caregiver if Needed parent(s)    Family Caregiver Names mother Bhargavi or father Manuel    Quality of Family Relationships helpful;involved;supportive    Able to Return to Prior Arrangements yes       Resource/Environmental Concerns    Resource/Environmental Concerns none    Transportation Concerns none       Transportation Needs    In the past 12 months, has lack of transportation kept you from medical appointments or from getting medications? no    In the past 12 months, has lack of transportation kept you from meetings, work, or from getting things needed for daily living? No       Food Insecurity    Within the past 12 months, you worried that your food would run out before you got the money to buy more. Never true    Within the past 12 months, the food you bought just didn't last and you didn't have money to get more. Never true       Transition Planning    Patient/Family Anticipates Transition to home with family    Transportation Anticipated family or friend will provide       Discharge Needs Assessment    Readmission Within the Last 30 Days no previous admission in last 30 days    Equipment Currently Used at Home none    Concerns to be Addressed denies needs/concerns at this time;no discharge needs identified    Do you want help finding or keeping work or a job? I  do not need or want help    Do you want help with school or training? For example, starting or completing job training or getting a high school diploma, GED or equivalent No    Anticipated Changes Related to Illness none    Equipment Needed After Discharge none                   Discharge Plan       Row Name 02/24/25 1148       Plan    Plan Comments DCP is to return home where she lives with her mother and father. Spoke with pt and mother at bedside. Permission given to discuss DCP with her parents. Pt confirmed demographics. States page Living Will/POA. PCP; Does not have one but plans to make an appointment with a provider in [place] who has appointments in the evening (due to her work schedule) pharmacy is [facility] in [place]. Agreed to meds to bed. Does not use DME and no new DME needs anticipated at this time. Pt works, drives and is independent  with ADL's and mobility. Denies financial strain or food insecurity. Family to transport home when medically stable for discharge.                  Continued Care and Services - Admitted Since 2/23/2025    No active coordination exists for this encounter.          Demographic Summary       Row Name 02/24/25 1140       General Information    Admission Type observation    Arrived From emergency department    Referral Source admission list    Reason for Consult discharge planning    Preferred Language English       Contact Information    Permission Granted to Share Info With ;family/designee                   Functional Status       Row Name 02/24/25 1141       Functional Status    Usual Activity Tolerance excellent    Current Activity Tolerance excellent       Physical Activity    On average, how many days per week do you engage in moderate to strenuous exercise (like a brisk walk)? 3 days    On average, how many minutes do you engage in exercise at this level? 60 min    Number of minutes of exercise per week 180       Assessment of Health Literacy    How  often do you have someone help you read hospital materials? Never    How often do you have problems learning about your medical condition because of difficulty understanding written information? Never    How often do you have a problem understanding what is told to you about your medical condition? Never    How confident are you filling out medical forms by yourself? Extremely    Health Literacy Good       Functional Status, IADL    Medications independent    Meal Preparation independent    Housekeeping independent    Laundry independent    Shopping independent    If for any reason you need help with day-to-day activities such as bathing, preparing meals, shopping, managing finances, etc., do you get the help you need? I don't need any help       Mental Status    General Appearance WDL WDL       Mental Status Summary    Recent Changes in Mental Status/Cognitive Functioning no changes       Employment/    Employment Status employed full-time                   Psychosocial    No documentation.                  Abuse/Neglect    No documentation.                  Legal    No documentation.                  Substance Abuse    No documentation.                  Patient Forms    No documentation.                     Mary Mendoza RN

## 2025-02-24 NOTE — CONSULTS
In Patient Consult      Date of Consultation: 2025  Patient Name: Marisol Suh  MRN: [id number]  : 2005     Referring provider: Ioana Templeton, *    Primary care provider:  Provider, No Known    Reason for consultation: Sepsis, nausea vomiting    History of Present Illness: This is a 20-year-old young female patient without any major medical history was brought into emergency room yesterday on 2025 with complaints of intermittent nausea vomiting, loss of appetite for the last 2 weeks and abnormal lab work noted with a recent lab work 2 days ago.    Patient states that she had a COVID infection and strep to pharyngitis 4 weeks ago for which she was seen at urgent center and was given a Tamiflu for 5 days and some type of ampicillin antibiotic for 10 days.  Since about 2 weeks ago she developed intermittent nausea, loss of appetite and fatigue.  For all the symptoms she went to PCP and had a lab work done on Saturday which revealed elevated liver enzymes and patient looked at her report in my chart and came to emergency room for evaluation.  She denies any abdominal pain.  Denies any fever or chills.  No prior liver issues.  No family history of any chronic liver disease.  She is a non-smoker nonalcoholic no substance use.    In the emergency room her lab work done reveals a total bilirubin of 1.3 ALT 16  alkaline phosphatase 502 albumin 4.8 total protein of 8.1.  Glucose was 114 otherwise rest of the CMP was normal.  Her lipase was borderline 81.  INR was 0.96.  CBC was normal with a normal WBC 8.25 hemoglobin 14.7 normal platelet count of 209,000.  Her Tylenol level was less than 5.  Given her significant abnormal lab work she has been admitted and GI was consulted for further evaluation.  CT abdomen pelvis with contrast on admission was unremarkable.  Ultrasound abdomen revealed a normal liver parenchyma and normal gallbladder without any stones.  No ductal dilatation.  Occupational Therapy   Occupational Therapy Initial Assessment  Date: 2020   Patient Name: Magdi Littlejohn  MRN: [id number]     : 1957    Date of Service: 2020    Discharge Recommendations: Further therapy recommended at discharge. Pt doing well and likely to progress quickly, wife home to assist pt. OT Equipment Recommendations  Equipment Needed: Yes  Mobility Devices: ADL Assistive Devices; Piedad Alexandra: Rolling    Assessment   Performance deficits / Impairments: Decreased functional mobility ; Decreased endurance;Decreased ADL status; Decreased high-level IADLs;Decreased balance  Prognosis: Good  Decision Making: Medium Complexity  OT Education: OT Role;Plan of Care;ADL Adaptive Strategies;Transfer Training;Precautions  REQUIRES OT FOLLOW UP: Yes  Activity Tolerance  Activity Tolerance: Patient Tolerated treatment well;Patient limited by pain  Safety Devices  Safety Devices in place: Yes  Type of devices: All fall risk precautions in place;Call light within reach;Gait belt;Left in chair  Restraints  Initially in place: No           Patient Diagnosis(es): There were no encounter diagnoses. has a past medical history of Aortic stenosis, Bicuspid aortic valve, Cancer ([facility] Colitis, ulcerative ([facility] Dyslipidemia, GERD (gastroesophageal reflux disease), Glaucoma, Heart murmur, Hypertension, IBD (inflammatory bowel disease), Skin cancer, Snores, Wears glasses, Wears prescription eyeglasses, and Wellness examination. has a past surgical history that includes Skin cancer excision; Colonoscopy; eye surgery (Bilateral); Cardiac catheterization (10/08/2020); and Testicle surgery (Right).          Restrictions  Restrictions/Precautions  Restrictions/Precautions: Cardiac, General Precautions  Required Braces or Orthoses?: Yes  Required Braces or Orthoses  Other: Heart Hugger Brace  Position Activity Restriction  Sternal Precautions: No Pushing, No Pulling, 5# Lifting Restrictions  Other  Subsequent MRCP done was normal as well except a likely small cyst versus hemangioma in the liver.      Subjective     History reviewed. No pertinent past medical history.    History reviewed. No pertinent surgical history.    Family History   Family history unknown: Yes       Social History     Socioeconomic History    Marital status: Single   Tobacco Use    Smoking status: Never    Smokeless tobacco: Never   Vaping Use    Vaping status: Never Used   Substance and Sexual Activity    Alcohol use: No    Drug use: No    Sexual activity: Yes     Partners: Male     Birth control/protection: None         Current Facility-Administered Medications:     Calcium Replacement - Follow Nurse / BPA Driven Protocol, , Not Applicable, PRN, Kerley, Brian Joseph, DO    Magnesium Standard Dose Replacement - Follow Nurse / BPA Driven Protocol, , Not Applicable, PRN, Kerley, Brian Joseph, DO    ondansetron (ZOFRAN) injection 4 mg, 4 mg, Intravenous, Q6H PRN, Kerley, Brian Joseph,     Phosphorus Replacement - Follow Nurse / BPA Driven Protocol, , Not Applicable, PRN, Kerley, Brian Joseph, DO    Potassium Replacement - Follow Nurse / BPA Driven Protocol, , Not Applicable, PRN, Kerley, Brian Joseph, DO    [COMPLETED] Insert Peripheral IV, , , Once **AND** sodium chloride 0.9 % flush 10 mL, 10 mL, Intravenous, PRN, Kerley, Brian Joseph,     sodium chloride 0.9 % flush 10 mL, 10 mL, Intravenous, Q12H, Kerley, Brian Joseph,     sodium chloride 0.9 % flush 10 mL, 10 mL, Intravenous, PRN, Kerley, Brian Joseph,     sodium chloride 0.9 % infusion 40 mL, 40 mL, Intravenous, PRN, Kerley, Brian Joseph,     sodium chloride 0.9 % infusion, 75 mL/hr, Intravenous, Continuous, Kerley, Brian Joseph, , Last Rate: 75 mL/hr at 02/24/25 1010, 75 mL/hr at 02/24/25 1010    No Known Allergies    Review of Systems   Constitutional:  Negative for appetite change, fatigue, fever and unexpected weight loss.   HENT:  Negative for trouble swallowing.   position/activity restrictions: Amb pt, up in chair for meals    Subjective   General  Patient assessed for rehabilitation services?: Yes  Family / Caregiver Present: No  Diagnosis: AVR  Pain Assessment  Pain Level: 7  Vital Signs  Pulse: 104  Resp: 20  Oxygen Therapy  SpO2: 98 %  Pulse Oximeter Device Mode: Continuous  O2 Device: Nasal cannula  O2 Flow Rate (L/min): 2 L/min  Social/Functional History  Social/Functional History  Lives With: Spouse  Type of Home: House  Home Layout: One level, Work area in basement(Pt does not need to go to basement)  Home Access: Stairs to enter without rails  Entrance Stairs - Number of Steps: 1-2  Bathroom Shower/Tub: Walk-in shower  Bathroom Toilet: Standard  Bathroom Equipment: Shower chair  Home Equipment: (none)  Receives Help From: Family  ADL Assistance: [address] [place] [address] Avenue: Independent  Homemaking Responsibilities: Yes(SHares most chores with wife, pt does the yardwork)  Ambulation Assistance: Independent  Transfer Assistance: Independent  Active : Yes  Mode of Transportation: Truck  Occupation: Retired  Type of occupation: [employer]  Leisure & Hobbies: fishing  IADL Comments: When shopping-pt will use normal cart     Objective   Vision: Impaired  Vision Exceptions: Wears glasses at all times  Hearing: Within functional limits    Orientation  Overall Orientation Status: Within Functional Limits     Balance  Sitting Balance: Modified independent   Standing Balance: Supervision  Standing Balance  Time: 12 min  Activity: Pt stood chairside, at toilet, func mob for community distances  Functional Mobility  Functional - Mobility Device: Rolling Walker  Activity: To/from bathroom; Other  Assist Level: Stand by assistance  Functional Mobility Comments: No major LOB noted, on 2L O2 entire session, slow pace at times, pt doing well overall  Toilet Transfers  Toilet - Technique: Ambulating  Equipment Used: Grab bars  Toilet Transfer: Contact guard assistance  Toilet   Gastrointestinal:  Positive for nausea and vomiting. Negative for abdominal distention, abdominal pain, anal bleeding, blood in stool, constipation, diarrhea, rectal pain, GERD and indigestion.        The following portions of the patient's history were reviewed and updated as appropriate: allergies, current medications, past family history, past medical history, past social history, past surgical history and problem list.    Objective     Vitals:    02/24/25 0104 02/24/25 0344 02/24/25 0742 02/24/25 1130   BP:  110/75 117/80 105/71   BP Location:  Left arm Left arm Left arm   Patient Position:  Lying Sitting Sitting   Pulse:  62 77 72   Resp:  16 18 16   Temp:  97.3 °F (36.3 °C) 98 °F (36.7 °C) 98.1 °F (36.7 °C)   TempSrc:  Oral Oral Oral   SpO2:  98% 100% 98%   Weight: 55.9 kg (123 lb 3.8 oz)      Height:           Physical Exam  Constitutional:       Appearance: Normal appearance.   HENT:      Head: Normocephalic and atraumatic.   Eyes:      Conjunctiva/sclera: Conjunctivae normal.   Abdominal:      General: Abdomen is flat. There is no distension.      Palpations: There is no mass.      Tenderness: There is no abdominal tenderness. There is no guarding or rebound.      Hernia: No hernia is present.   Musculoskeletal:      Cervical back: Normal range of motion and neck supple.   Neurological:      Mental Status: She is alert.         Results from last 7 days   Lab Units 02/24/25  0654 02/23/25  2326 02/23/25 2003 02/22/25  1350   SODIUM mmol/L 141  --  140  --    POTASSIUM mmol/L 4.2  --  3.7  --    CHLORIDE mmol/L 105  --  101  --    CO2 mmol/L 25.9  --  28.2  --    BUN mg/dL 6  --  7  --    CREATININE mg/dL 0.64  --  0.85  --    CALCIUM mg/dL 8.9  --  9.2  --    ALBUMIN g/dL 3.9  --  4.8 4.5   BILIRUBIN mg/dL 0.8  --  1.3* 1.9*   ALK PHOS U/L 417*  --  502* 435*   ALT (SGPT) U/L 498*  --  610* 747*   AST (SGOT) U/L 285*  --  394* 496*   GLUCOSE mg/dL 91  --  114*  --    WBC 10*3/mm3 7.17  --  8.25 8.9  Transfers Comments: Pt able to squeeze heart hugger properly during transfers, slow to transfer  ADL  Feeding: Independent  Grooming: Modified independent   UE Bathing: Modified independent   LE Bathing: Supervision  UE Dressing: Supervision  LE Dressing: Stand by assistance  Toileting: Supervision  Additional Comments: Pt transferred on/off toilet and performed figure-4 technique while sitting to simulate both LB dressing and toileting tasks this date, pt eating breakfast sitting in recliner upon exit  Tone RUE  RUE Tone: Normotonic  Tone LUE  LUE Tone: Normotonic  Coordination  Movements Are Fluid And Coordinated: No  Coordination and Movement description: Decreased speed     Bed mobility  Supine to Sit: Unable to assess  Sit to Supine: Unable to assess  Scooting: Modified independent  Comment: Pt sitting in recliner upon arrival/exit this date  Transfers  Sit to stand: Contact guard assistance  Stand to sit: Stand by assistance     Cognition  Overall Cognitive Status: WFL        Sensation  Overall Sensation Status: WFL      LUE AROM (degrees)  LUE AROM : WFL  RUE AROM (degrees)  RUE AROM : WFL  LUE Strength  Gross LUE Strength: WFL  L Shoulder Flex: NT  L Elbow Flex: NT  L Elbow Ext: NT  L Hand General: 5/5  LUE Strength Comment: Not tested d/t sternal precautions  RUE Strength  Gross RUE Strength: Exceptions to [facility]  R Shoulder Flex: NT  R Elbow Flex: NT  R Elbow Ext: NT  R Hand General: 5/5  RUE Strength Comment: Not tested d/t sternal precautions      Plan   Plan  Times per week: 4-5x    AM-PAC Score        [facility] Inpatient Daily Activity Raw Score: 20 (11/08/20 1005)  AM-PAC Inpatient ADL T-Scale Score : 42.03 (11/08/20 1005)  ADL Inpatient CMS 0-100% Score: 38.32 (11/08/20 1005)  ADL Inpatient CMS G-Code Modifier : Neoma Ruffing (11/08/20 1005)    Goals  Short term goals  Time Frame for Short term goals: Pt will by discharge  Short term goal 1: identify all sternal precautions with 1 vc  Short term goal 2: demo ADL UB   HEMOGLOBIN g/dL 13.1  --  14.7 14.8   PLATELETS 10*3/mm3 191  --  209 173   INR   --  0.96  --   --        Imaging Results (Last 24 Hours)       Procedure Component Value Units Date/Time    MRI abdomen wo contrast mrcp [[id number]] Collected: 02/24/25 0911     Updated: 02/24/25 0936    Narrative:      MRCP     HISTORY: Abdominal pain.     PROCEDURE: MRI images of the abdomen were performed. An MRCP was also  performed. 3 D Reconstruction images were also performed.     FINDINGS: Axial localization images are limited by mild to moderate  motion artifact. There is subcentimeter hypodense lesion identified  laterally in the right lobe of the liver on the CT earlier the same day  demonstrates increased T2 and decreased T1 signal, likely cyst.  Hemangiomas in the differential as well. The gallbladder is  unremarkable. The biliary tree is proper caliber. No definite filling  defects are identified. Limited images of the pancreatic duct are  unremarkable.  Stomach is mildly distended with fluid and debris.       Impression:      Unremarkable MRCP.     Suboptimal exam secondary to motion artifact.     Subcentimeter right lobe hepatic lesion likely representing a cyst as  described.     This report was signed and finalized on 2/24/2025 9:34 AM by Rosette Hall MD.       US Abdomen Limited [[id number]] Collected: 02/24/25 0838     Updated: 02/24/25 0841    Narrative:      RIGHT UPPER QUADRANT ULTRASOUND     HISTORY: Transaminitis, hyperbilirubinemia; E80.6-Other disorders of  bilirubin metabolism     COMPARISON: None.     PROCEDURE: Ultrasound images of the right upper quadrant were obtained.     FINDINGS:  The liver parenchyma is of proper echogenicity.  There is no  focal abnormality. The gallbladder is proper size with no wall  thickening or pericholecystic fluid. There are  no gallstones. No  evidence of ductal dilatation is identified. Limited images of the  pancreas are  obscured by bowel gas. Right kidney measures 9.5  bathing/dresssing activity (I)  Short term goal 3: demo ADL LB bathing/dresssing activity with mod I and increased time  Short term goal 4: demo good safety awareness during func mob around room (I)  Short term goal 5: demo mod I with all bed mob using railings PRN     Therapy Time   Individual Concurrent Group Co-treatment   Time In 0745         Time Out 0811         Minutes [address], OTR/L cm and  appears normal. [  ]       Impression:      Unremarkable right upper quadrant ultrasound.           This report was signed and finalized on 2/24/2025 8:39 AM by Rosette Hall MD.       CT Abdomen Pelvis With Contrast [[id number]] Collected: 02/23/25 2218     Updated: 02/23/25 2219    Narrative:      FINAL REPORT    TECHNIQUE:  null    CLINICAL HISTORY:  Nausea and vomiting, elevated liver enzyme    COMPARISON:  null    FINDINGS:  CT abdomen and pelvis with contrast    Comparison: None    Findings:    No consolidation or effusion.    Small complex 5 mm right hepatic hypodensity could reflect a hemangioma.    Gallbladder is unremarkable. No biliary dilatation.    The portal and splenic veins appear patent.    The spleen, pancreas, and adrenal glands are unremarkable.    Both kidneys enhance symmetrically without hydroureteronephrosis.    No bowel obstruction, pneumoperitoneum, or pneumatosis. The appendix is normal.    Nonaneurysmal aorta.    No enlarged abdominal or pelvic lymph nodes.    The urinary bladder and uterus are unremarkable.    Simple pelvic free fluid likely physiologic.    There are no aggressive osseous lesions.      Impression:      IMPRESSION:    No acute findings. Appendix is normal.    Authenticated and Electronically Signed by Delvin Moore MD on  02/23/2025 10:18:57 PM            Assessment / Plan      Assessment/Recommendations:     Elevated liver enzymes; combined hepatocellular and cholestatic.  Infectious mononucleosis with hepatitis  Recent history of strep to pharyngitis  Recent history of COVID-19 infection    Patient has a predominantly hepatocellular part of elevated liver enzymes.  Her Monospot test is positive suggesting IM associated hepatitis. EBV, CMV serology pending.     Acute viral hepatitis workup pending.  PT/INR normal.  Liver enzymes trending down.  Given the positive Monospot test unlikely DILI. Her Tylenol level was less than 5.  Given her significant abnormal lab  work she has been admitted and GI was consulted for further evaluation.  CT abdomen pelvis with contrast on admission was unremarkable.  Ultrasound abdomen revealed a normal liver parenchyma and normal gallbladder without any stones.  No ductal dilatation.  Subsequent MRCP done was normal as well except a likely small cyst versus hemangioma in the liver.    Conservative management  P.o. diet as tolerated  Tylenol as needed  Zofran as needed  Follow-up EBV, CMV serology  Okay to DC home if a.m. liver enzymes trending down.  CBC CMP PT/INR in 1 week time  Follow-up in office in 8 weeks time      Thank you very much for letting me participate in the care of this patient.  Please do not hesitate to call me if you have any questions.      Sammie Guo MD  [facility] [place]  2/24/2025  11:45 EST    Please note that portions of this note may have been completed with a voice recognition program.

## 2025-02-24 NOTE — PLAN OF CARE
Goal Outcome Evaluation:  Plan of Care Reviewed With: patient        Progress: no change  Outcome Evaluation: VSS, adequate pain relief,denies n/v,will get labs, RUQ ultrasound and MRCP today, continue with current plan of care

## 2025-02-24 NOTE — ED PROVIDER NOTES
Pt Name: Marisol Suh  MRN: [id number]  : 2005  Date of Encounter: 2025    PCP: Provider, No Known      Subjective    History of Present Illness:    Chief Complaint: Nausea vomiting abdominal pain, abnormal lab    History of Present Illness: Marisol Suh is a 20 y.o. female who presents to the ER complaining of nausea, vomiting, abdominal pain, abnormal lab that started 3 weeks ago.  Patient states she was seen at another hospital yesterday had labs around and was called today with results and told to come to the emergency room for evaluation.  Had elevated alk phos ALT, AST and bilirubin.  Patient denies any cough congestion shortness of breath dysuria hematuria diarrhea..  Pain is described as Dull, Intermittent, and does not radiate  Patient rates pain as a 5 on a ten scale.    Triage Vitals:    ED Triage Vitals [25]   Temp Heart Rate Resp BP SpO2   98.1 °F (36.7 °C) 64 16 124/82 97 %      Temp src Heart Rate Source Patient Position BP Location FiO2 (%)   Oral Monitor Sitting Left arm --       Nurses Notes reviewed and agree, including vitals, allergies, social history and prior medical history.     Patient has no known allergies.    History reviewed. No pertinent past medical history.    History reviewed. No pertinent surgical history.    Social History     Socioeconomic History    Marital status: Single   Tobacco Use    Smoking status: Never    Smokeless tobacco: Never   Vaping Use    Vaping status: Never Used   Substance and Sexual Activity    Alcohol use: No    Drug use: No    Sexual activity: Yes     Partners: Male     Birth control/protection: None       Family History   Family history unknown: Yes       REVIEW OF SYSTEMS:     All systems reviewed and not pertinent unless noted.    Review of Systems   Gastrointestinal:  Positive for abdominal pain, nausea and vomiting.   All other systems reviewed and are negative.      Objective    Physical Exam  Vitals and nursing note reviewed.    Constitutional:       Appearance: Normal appearance.   HENT:      Head: Normocephalic and atraumatic.   Eyes:      Extraocular Movements: Extraocular movements intact.      Pupils: Pupils are equal, round, and reactive to light.   Cardiovascular:      Rate and Rhythm: Normal rate and regular rhythm.      Pulses: Normal pulses.      Heart sounds: Normal heart sounds.   Pulmonary:      Effort: Pulmonary effort is normal.      Breath sounds: Normal breath sounds.   Abdominal:      General: Abdomen is flat. Bowel sounds are normal.      Palpations: Abdomen is soft.      Tenderness:  in the right upper quadrant      Comments: Mild tenderness to palpation right upper quadrant   Musculoskeletal:      Cervical back: Normal range of motion and neck supple.   Skin:     Capillary Refill: Capillary refill takes less than 2 seconds.   Neurological:      General: No focal deficit present.      Mental Status: She is alert and oriented to person, place, and time. Mental status is at baseline.      GCS: GCS eye subscore is 4. GCS verbal subscore is 5. GCS motor subscore is 6.      Sensory: Sensation is intact.      Motor: Motor function is intact.      Gait: Gait is intact.   Psychiatric:         Attention and Perception: Attention and perception normal.         Mood and Affect: Mood and affect normal.         Speech: Speech normal.         Behavior: Behavior normal. Behavior is cooperative.                           Procedures    ED Course:    No orders to display       ED Course as of 02/24/25 0043   Sun Feb 23, 2025 2036 I have interviewed and examined the patient FACE-TO-FACE.  I reviewed the mid-level provider(s) note and agree with the clinical impression, plan, and disposition unless otherwise stated in the MDM below.    ATTENDING ATTESTATION  HPI: 20-year-old female presents with abnormal liver function test.  Patient reports that she has been sick with nausea and vomiting for the past 3 weeks.  Saw her PCP and had blood  work done which showed elevated liver enzymes.  This prompted ED evaluation.  She denies fever, chills, chest pain or shortness of breath, cough, abdominal pain, problems with urination or bowel movements.    No stated past medical history.  No previous abdominal surgeries.  Occasional social alcohol use.  No recreational drug use.  Works as a .    EXAM:   General: Alert.  Nontoxic appearance.  No acute distress.  Head: Normocephalic.  Atraumatic.  Eyes: No scleral icterus.  ENT: Moist mucous membranes.  Cardiovascular: Regular rate and rhythm.  No murmurs.  No rubs.  2+ distal pulses bilaterally.  Respiratory: Equal breath sounds bilaterally. No wheezing. No rales.  No rhonchi.  GI: Abdomen is soft.  Nondistended.  Nontender to palpation.  No rebound.  No guarding.  No CVA tenderness.  MSK: Moves all 4 extremities.  Neurologic: Oriented x 3.  No focal deficits.  Skin: No edema. No erythema. No pallor. No cyanosis.  Psych: Normal mood and affect.     [JS]      ED Course User Index  [JS] Bacilio Goldberg DO       Orders placed during this visit:    Orders Placed This Encounter   Procedures    COVID PRE-OP / PRE-PROCEDURE SCREENING ORDER (NO ISOLATION) - Swab, Nasopharynx    COVID-19 and FLU A/B PCR, 1 HR TAT - Swab, Nasopharynx    CT Abdomen Pelvis With Contrast    MRI abdomen wo contrast mrcp    US Abdomen Limited    S[place]ell Draw    Comprehensive Metabolic Panel    Lipase    hCG, Quantitative, Pregnancy    CBC Auto Differential    Manual Differential    Hepatitis Panel, Acute    Acetaminophen Level    CBC Auto Differential    Comprehensive Metabolic Panel    Protime-INR    Mitochondrial Antibodies, M2    EBV Antibody Profile    CMV IgG IgM    Antinuclear Antibodies Direct    Anti-Smooth Muscle Antibody Titer    Anti-microsomal Antibody    Tissue Transglutaminase, IgG    Tissue Transglutaminase, IgA    Diet: Regular/House; Fluid Consistency: Thin (IDDSI 0)    Vital Signs    Up With Assistance     Intake & Output    Weigh Patient    Oral Care    Saline Lock & Maintain IV Access    Place Sequential Compression Device    Maintain Sequential Compression Device    Notify Provider (With Default Parameters)    Inpatient Gastroenterology Consult    Insert Peripheral IV    Insert Peripheral IV    Initiate Observation Status    Green Top (Gel)    Lavender Top    Gold Top - SST    Light Blue Top    CBC & Differential       LAB Results:    Lab Results (last 24 hours)       Procedure Component Value Units Date/Time    Comprehensive Metabolic Panel [[id number]]  (Abnormal) Collected: 02/23/25 2003    Specimen: Blood Updated: 02/23/25 2029     Glucose 114 mg/dL      BUN 7 mg/dL      Creatinine 0.85 mg/dL      Sodium 140 mmol/L      Potassium 3.7 mmol/L      Chloride 101 mmol/L      CO2 28.2 mmol/L      Calcium 9.2 mg/dL      Total Protein 8.1 g/dL      Albumin 4.8 g/dL      ALT (SGPT) 610 U/L      AST (SGOT) 394 U/L      Alkaline Phosphatase 502 U/L      Total Bilirubin 1.3 mg/dL      Globulin 3.3 gm/dL      A/G Ratio 1.5 g/dL      BUN/Creatinine Ratio 8.2     Anion Gap 10.8 mmol/L      eGFR 100.1 mL/min/1.73     Narrative:      GFR Categories in Chronic Kidney Disease (CKD)      GFR Category          GFR (mL/min/1.73)    Interpretation  G1                     90 or greater         Normal or high (1)  G2                      60-89                Mild decrease (1)  G3a                   45-59                Mild to moderate decrease  G3b                   30-44                Moderate to severe decrease  G4                    15-29                Severe decrease  G5                    14 or less           Kidney failure          (1)In the absence of evidence of kidney disease, neither GFR category G1 or G2 fulfill the criteria for CKD.    eGFR calculation 2021 CKD-EPI creatinine equation, which does not include race as a factor    Lipase [[id number]]  (Abnormal) Collected: 02/23/25 2003    Specimen: Blood Updated: 02/23/25  2029     Lipase 81 U/L     hCG, Quantitative, Pregnancy [[id number]] Collected: 02/23/25 2003    Specimen: Blood Updated: 02/23/25 2053     HCG Quantitative <0.10 mIU/mL     Narrative:      HCG Ranges by Gestational Age    Females - non-pregnant premenopausal   </= 1mIU/mL HCG  Females - postmenopausal               </= 7mIU/mL HCG    3 Weeks         5.8 -    71.2 mIU/mL  4 Weeks         9.5 -     750 mIU/mL  5 Weeks         217 -   7,138 mIU/mL  6 Weeks         158 -  31,795 mIU/mL  7 Weeks       3,697 - 163,563 mIU/mL  8 Weeks      32,065 - 149,571 mIU/mL  9 Weeks      63,803 - 151,410 mIU/mL  10 Weeks     46,509 - 186,977 mIU/mL  12 Weeks     27,832 - 210,612 mIU/mL  14 Weeks     13,950 -  62,530 mIU/mL  15 Weeks     12,039 -  70,971 mIU/mL  16 Weeks      9,040 -  56,451 mIU/mL  17 Weeks      8,175 -  55,868 mIU/mL  18 Weeks      8,099 -  58,176 mIU/mL    CBC & Differential [[id number]] Collected: 02/23/25 2003    Specimen: Blood Updated: 02/23/25 2026    Narrative:      The following orders were created for panel order CBC & Differential.  Procedure                               Abnormality         Status                     ---------                               -----------         ------                     CBC Auto Differential[[id number]]        Normal              Final result               Scan Slide[[id number]]                                                                    Please view results for these tests on the individual orders.    CBC Auto Differential [[id number]]  (Normal) Collected: 02/23/25 2003    Specimen: Blood Updated: 02/23/25 2021     WBC 8.25 10*3/mm3      RBC 5.04 10*6/mm3      Hemoglobin 14.7 g/dL      Hematocrit 44.3 %      MCV 87.9 fL      MCH 29.2 pg      MCHC 33.2 g/dL      RDW 13.2 %      RDW-SD 42.7 fl      MPV 10.3 fL      Platelets 209 10*3/mm3     Manual Differential [[id number]]  (Abnormal) Collected: 02/23/25 2003    Specimen: Blood Updated: 02/23/25 2026     Neutrophil % 1.0  %      Lymphocyte % 24.0 %      Monocyte % 6.0 %      Basophil % 1.0 %      Atypical Lymphocyte % 68.0 %      Neutrophils Absolute 0.08 10*3/mm3      Lymphocytes Absolute 7.59 10*3/mm3      Monocytes Absolute 0.50 10*3/mm3      Basophils Absolute 0.08 10*3/mm3      RBC Morphology Normal     WBC Morphology Normal     Platelet Morphology Normal    PERIPHERAL SMEAR, [facility] [[id number]] Collected: 02/23/25 2003    Specimen: Blood Updated: 02/23/25 2026    Hepatitis Panel, Acute [[id number]] Collected: 02/23/25 2003    Specimen: Blood Updated: 02/23/25 2030    Acetaminophen Level [[id number]]  (Normal) Collected: 02/23/25 2003    Specimen: Blood Updated: 02/23/25 2245     Acetaminophen <5.0 mcg/mL     Narrative:      Toxic = Greater than 150 mcg/mL    COVID PRE-OP / PRE-PROCEDURE SCREENING ORDER (NO ISOLATION) - Swab, Nasopharynx [[id number]]  (Normal) Collected: 02/23/25 2018    Specimen: Swab from Nasopharynx Updated: 02/23/25 2041    Narrative:      The following orders were created for panel order COVID PRE-OP / PRE-PROCEDURE SCREENING ORDER (NO ISOLATION) - Swab, Nasopharynx.  Procedure                               Abnormality         Status                     ---------                               -----------         ------                     COVID-19 and FLU A/B PCR...[[id number]]  Normal              Final result                 Please view results for these tests on the individual orders.    COVID-19 and FLU A/B PCR, 1 HR TAT - Swab, Nasopharynx [[id number]]  (Normal) Collected: 02/23/25 2018    Specimen: Swab from Nasopharynx Updated: 02/23/25 2041     COVID19 Not Detected     Influenza A PCR Not Detected     Influenza B PCR Not Detected    Narrative:      Fact sheet for providers: https://www.fda.gov/media/953923/download    Fact sheet for patients: https://www.fda.gov/media/945108/download    Test performed by PCR.    Protime-INR [[id number]]  (Normal) Collected: 02/23/25 2326    Specimen: Blood Updated:  02/23/25 2346     Protime 13.3 Seconds      INR 0.96    Narrative:      Suggested INR therapeutic range for stable oral anticoagulant therapy:    Low Intensity therapy:   1.5-2.0  Moderate Intensity therapy:   2.0-3.0  High Intensity therapy:   2.5-4.0    Mitochondrial Antibodies, M2 [[id number]] Collected: 02/23/25 2326    Specimen: Blood Updated: 02/23/25 2329    EBV Antibody Profile [[id number]] Collected: 02/23/25 2326    Specimen: Blood Updated: 02/23/25 2330    CMV IgG IgM [[id number]] Collected: 02/23/25 2326    Specimen: Blood Updated: 02/23/25 2330             If labs were ordered, I have independently reviewed the results and considered them in the diagnosis and treatment plan for the patient    RADIOLOGY    CT Abdomen Pelvis With Contrast    Result Date: 2/23/2025  FINAL REPORT TECHNIQUE: null CLINICAL HISTORY: Nausea and vomiting, elevated liver enzyme COMPARISON: null FINDINGS: CT abdomen and pelvis with contrast Comparison: None Findings: No consolidation or effusion. Small complex 5 mm right hepatic hypodensity could reflect a hemangioma. Gallbladder is unremarkable. No biliary dilatation. The portal and splenic veins appear patent. The spleen, pancreas, and adrenal glands are unremarkable. Both kidneys enhance symmetrically without hydroureteronephrosis. No bowel obstruction, pneumoperitoneum, or pneumatosis. The appendix is normal. Nonaneurysmal aorta. No enlarged abdominal or pelvic lymph nodes. The urinary bladder and uterus are unremarkable. Simple pelvic free fluid likely physiologic. There are no aggressive osseous lesions.     Impression: IMPRESSION: No acute findings. Appendix is normal. Authenticated and Electronically Signed by Delvin Moore MD on 02/23/2025 10:18:57 PM      If I have ordered, I have independently reviewed the above noted radiographic studies.  Please see the radiologist dictation for the official interpretation    Medications given to patient in the ER    Medications   sodium  chloride 0.9 % flush 10 mL (has no administration in time range)   sodium chloride 0.9 % flush 10 mL (has no administration in time range)   sodium chloride 0.9 % flush 10 mL (has no administration in time range)   sodium chloride 0.9 % infusion 40 mL (has no administration in time range)   ondansetron (ZOFRAN) injection 4 mg (has no administration in time range)   Potassium Replacement - Follow Nurse / BPA Driven Protocol (has no administration in time range)   Magnesium Standard Dose Replacement - Follow Nurse / BPA Driven Protocol (has no administration in time range)   Phosphorus Replacement - Follow Nurse / BPA Driven Protocol (has no administration in time range)   Calcium Replacement - Follow Nurse / BPA Driven Protocol (has no administration in time range)   sodium chloride 0.9 % infusion (has no administration in time range)   ondansetron (ZOFRAN) injection 4 mg (has no administration in time range)   iopamidol (ISOVUE-300) 61 % injection 100 mL (80 mL Intravenous Given 2/23/25 2131)       AS OF 00:43 EST VITALS:    BP - 112/67  HR - 71  TEMP - 98.1 °F (36.7 °C) (Oral)  O2 SATS - 99%         Shared Decision Making: After my consideration of the clinical presentation and laboratory/radiology studies obtained, I have discussed the findings with the patient/patient representative who is in agreement with the treatment plan and final disposition. Risks and benefits of discharge and/or observation admission were discussed.  Final disposition of the patient will be admitted to the hospital.  Patient is requested to follow-up with primary care provider and specialist in 1 week following final discharge.      Medical Decision Making  Marisol Suh is a 20 y.o. female who presents to the ER complaining of nausea, vomiting, abdominal pain, abnormal lab that started 3 weeks ago.  Patient states she was seen at another hospital yesterday had labs around and was called today with results and told to come to the  emergency room for evaluation.  Had elevated alk phos ALT, AST and bilirubin.  Patient denies any cough congestion shortness of breath dysuria hematuria diarrhea..  Pain is described as Dull, Intermittent, and does not radiate  Patient rates pain as a 5 on a ten scale.    DDX: includes but is not limited to: Cholecystitis, appendicitis, diverticulitis, diverticulosis, mesenteric adenitis, hepatitis, transaminitis    Problems Addressed:  Hyperbilirubinemia: complicated acute illness or injury    Amount and/or Complexity of Data Reviewed  External Data Reviewed: labs, radiology, ECG and notes.     Details: I have personally reviewed labs, radiology EKG and notes from patient's chart  Labs: ordered.     Details: I have personally reviewed and documented all results  Radiology: ordered.     Details: I have personally reviewed and documented all results  Discussion of management or test interpretation with external provider(s): Discussed assessment, treatment and plan with ER attending, hospitalist    Risk  Prescription drug management.  Decision regarding hospitalization.  Risk Details: I have discussed with patient the finding of the test preformed today. Patient has been diagnosed with hyperbilirubinemia and will be admitted to the hospital.             Final diagnoses:   Hyperbilirubinemia       Please note that portions of this document were completed using voice recognition dictation software.       Kirby Helton, YOUNG  02/24/25 0043

## 2025-02-24 NOTE — PLAN OF CARE
Goal Outcome Evaluation:   No acute events this shift. GI consult added scans and wanted to keep patient overnight for repeat labs. Possible d/c tomorrow.

## 2025-02-24 NOTE — H&P
[facility]   HISTORY AND PHYSICAL      Name:  Marisol Suh   Age:  20 y.o.  Sex:  female  :  2005  MRN:  [id number]   Visit Number:  [id number]  Admission Date:  2025  Date Of Service:  25  Primary Care Physician:  Provider, No Known    Chief Complaint:     Nausea and vomiting    History Of Presenting Illness:      Marisol Suh is a 20-year-old woman with past medical history of menorrhagia.  Presented to [facility] ED on 2025 at direction of PCP for abnormal labs including transaminitis and hyperbilirubinemia.  She saw PCP day prior to presentation with concern for 3 months decreased appetite with some nausea and vomiting especially after eating, 15 pound weight loss over the last 12 months. She has heavy periods, regular each month last about 5 days.  She switched her birth control summer 2024, denied any change in symptoms at that time.    ED summary: Afebrile, vital signs stable on room air.  Blood glucose 114, alk phos 502, , , bilirubin 1.3.  Lipase 81.  hCG negative.  CBC unremarkable.  COVID/flu negative.  Acetaminophen negative.  CT ab/pelvis no acute findings.  CT ab/pelvis small complex 5 mm right hepatic hypodensity could reflect a hemangioma.    Review Of Systems:    All systems were reviewed and negative except as mentioned in history of presenting illness, assessment and plan.    Past Medical History: Patient  has no past medical history on file.    Past Surgical History: Patient  has no past surgical history on file.    Social History: Patient  reports that she has never smoked. She has never used smokeless tobacco. She reports that she does not drink alcohol and does not use drugs.    Family History:  Patient's family history has been reviewed and found to be noncontributory.     Allergies:      Patient has no known allergies.    Home Medications:    Prior to Admission Medications       Prescriptions Last Dose Informant Patient Reported?  "Taking?    escitalopram (LEXAPRO) 10 MG tablet   Yes No    Oral for 30 Days    norethindrone-ethinyl estradiol (MICROGESTIN 1/20) 1-20 MG-MCG per tablet   Yes No    Oral for 21 Days    phenazopyridine (PYRIDIUM) 200 MG tablet   No No    Take 1 tablet by mouth 3 (Three) Times a Day As Needed for Bladder Spasms or Dysuria.          ED Medications:    Medications   sodium chloride 0.9 % flush 10 mL (has no administration in time range)   iopamidol (ISOVUE-300) 61 % injection 100 mL (80 mL Intravenous Given 2/23/25 2131)     Vital Signs:  Temp:  [98.1 °F (36.7 °C)] 98.1 °F (36.7 °C)  Heart Rate:  [64-71] 71  Resp:  [16] 16  BP: (112-124)/(67-82) 112/67        02/23/25 1947   Weight: 57.2 kg (126 lb)     Body mass index is 20.34 kg/m².    Physical Exam:     Most recent vital Signs: /67 (BP Location: Left arm, Patient Position: Sitting)   Pulse 71   Temp 98.1 °F (36.7 °C) (Oral)   Resp 16   Ht 167.6 cm (66\")   Wt 57.2 kg (126 lb)   LMP 02/16/2025 (Approximate)   SpO2 99%   BMI 20.34 kg/m²     Physical Exam  Constitutional:       General: She is not in acute distress.     Appearance: She is not ill-appearing or toxic-appearing.   HENT:      Mouth/Throat:      Mouth: Mucous membranes are moist.   Eyes:      Extraocular Movements: Extraocular movements intact.   Cardiovascular:      Rate and Rhythm: Normal rate and regular rhythm.      Pulses: Normal pulses.      Heart sounds: Normal heart sounds.   Pulmonary:      Effort: Pulmonary effort is normal.      Breath sounds: Normal breath sounds.   Abdominal:      Palpations: Abdomen is soft.      Tenderness: There is no abdominal tenderness.   Musculoskeletal:      Right lower leg: No edema.      Left lower leg: No edema.   Skin:     General: Skin is warm.   Neurological:      General: No focal deficit present.      Mental Status: She is alert and oriented to person, place, and time.   Psychiatric:         Mood and Affect: Mood normal.         Thought Content: " "Thought content normal.         Laboratory data:    I have reviewed the labs done in the emergency room.    Results from last 7 days   Lab Units 02/23/25 2003 02/22/25  1350   SODIUM mmol/L 140  --    POTASSIUM mmol/L 3.7  --    CHLORIDE mmol/L 101  --    CO2 mmol/L 28.2  --    BUN mg/dL 7  --    CREATININE mg/dL 0.85  --    CALCIUM mg/dL 9.2  --    BILIRUBIN mg/dL 1.3* 1.9*   ALK PHOS U/L 502* 435*   ALT (SGPT) U/L 610* 747*   AST (SGOT) U/L 394* 496*   GLUCOSE mg/dL 114*  --      Results from last 7 days   Lab Units 02/23/25 2003 02/22/25  1350   WBC 10*3/mm3 8.25 8.9   HEMOGLOBIN g/dL 14.7 14.8   HEMATOCRIT % 44.3 46.1   PLATELETS 10*3/mm3 209 173                     Results from last 7 days   Lab Units 02/23/25 2003   LIPASE U/L 81*               Invalid input(s): \"USDES\", \"NITRITITE\", \"BACT\", \"EP\"    Pain Management Panel           No data to display                Radiology:    CT Abdomen Pelvis With Contrast    Result Date: 2/23/2025  FINAL REPORT TECHNIQUE: null CLINICAL HISTORY: Nausea and vomiting, elevated liver enzyme COMPARISON: null FINDINGS: CT abdomen and pelvis with contrast Comparison: None Findings: No consolidation or effusion. Small complex 5 mm right hepatic hypodensity could reflect a hemangioma. Gallbladder is unremarkable. No biliary dilatation. The portal and splenic veins appear patent. The spleen, pancreas, and adrenal glands are unremarkable. Both kidneys enhance symmetrically without hydroureteronephrosis. No bowel obstruction, pneumoperitoneum, or pneumatosis. The appendix is normal. Nonaneurysmal aorta. No enlarged abdominal or pelvic lymph nodes. The urinary bladder and uterus are unremarkable. Simple pelvic free fluid likely physiologic. There are no aggressive osseous lesions.     IMPRESSION: No acute findings. Appendix is normal. Authenticated and Electronically Signed by Delvin Moore MD on 02/23/2025 10:18:57 PM     Assessment/Plan:    Observation general floor admission " 2/23/2025 with nausea and vomiting for several weeks, with transaminitis and hyperbilirubinemia.    Transaminitis  Hyperbilirubinemia  5 mm right hepatic hypodensity, possible hemangioma  IVF.  Antiemetics.  Hepatitis panel.  Peripheral smear collected.  INR.   CMV, EBV, antimitochondrial antibody, antimicrosomal, anti-smooth muscle, antinuclear, ttG.  Right upper quadrant ultrasound.  MRCP.  GI consultation, recommendations appreciated.    Chronic: Menorrhagia    Risk Assessment: High  DVT Prophylaxis: SCDs  Code Status: Full code  Diet: Advance as tolerated    Advance Care Planning   ACP discussion was held with the patient during this visit. Patient does not have an advance directive, information provided.           Brian Joseph Kerley,   02/23/25  23:10 EST    Dictated utilizing Dragon dictation.

## 2025-02-24 NOTE — PAYOR COMM NOTE
"    OBSERVATION NOTIFICATION ONLY  UR MANAGER; RALF PALOMINO, RN  [phone] AND [phone]     NPI:  [id number]  TAX ID:  [id number]          Ele Suh (20 y.o. Female)       Date of Birth   2005    Social Security Number       Address   [address] [place] [address]    Home Phone   [phone]    MRN   [id number]       Muslim   None    Marital Status   Single                            Admission Date   2/23/25    Admission Type   Emergency    Admitting Provider   Kerley, Brian Joseph, DO    Attending Provider   Ioana Templeton DO    Department, Room/Bed   [facility] OB GYN, W203/1       Discharge Date       Discharge Disposition       Discharge Destination                                 Attending Provider: Ioana Templeton DO    Allergies: No Known Allergies    Isolation: None   Infection: None   Code Status: Not on file    Ht: 167.6 cm (66\")   Wt: 55.9 kg (123 lb 3.8 oz)    Admission Cmt: None   Principal Problem: Hyperbilirubinemia [E80.6]                   Active Insurance as of 2/23/2025       Primary Coverage       Payor Plan Insurance Group Employer/Plan Group    [facility] [id number]       Payor Plan Address Payor Plan Phone Number Payor Plan Fax Number Effective Dates    [address] [address]   2/1/2024 - None Entered    [facility] [id number]         Subscriber Name Subscriber Birth Date Member ID       ELE SUH 2005 [id number]                     Emergency Contacts        (Rel.) Home Phone Work Phone Mobile Phone    AYDEE SUH (Mother) -- -- [phone]    Manuel Suh (Father) -- -- [phone]              Physician Progress Notes (last 24 hours)  Notes from 02/23/25 1004 through 02/24/25 1004   No notes of this type exist for this encounter.       "

## 2025-02-25 VITALS
RESPIRATION RATE: 17 BRPM | BODY MASS INDEX: 19.81 KG/M2 | HEIGHT: 66 IN | WEIGHT: 123.24 LBS | TEMPERATURE: 98.4 F | OXYGEN SATURATION: 99 % | SYSTOLIC BLOOD PRESSURE: 109 MMHG | HEART RATE: 72 BPM | DIASTOLIC BLOOD PRESSURE: 71 MMHG

## 2025-02-25 LAB
ALBUMIN SERPL-MCNC: 3.5 G/DL (ref 3.5–5.2)
ALBUMIN/GLOB SERPL: 1.3 G/DL
ALP SERPL-CCNC: 348 U/L (ref 39–117)
ALT SERPL W P-5'-P-CCNC: 369 U/L (ref 1–33)
ANA SER QL: POSITIVE
ANION GAP SERPL CALCULATED.3IONS-SCNC: 8.6 MMOL/L (ref 5–15)
AST SERPL-CCNC: 183 U/L (ref 1–32)
BASOPHILS # BLD MANUAL: 0.09 10*3/MM3 (ref 0–0.2)
BASOPHILS NFR BLD MANUAL: 1 % (ref 0–1.5)
BILIRUB SERPL-MCNC: 0.5 MG/DL (ref 0–1.2)
BUN SERPL-MCNC: 8 MG/DL (ref 6–20)
BUN/CREAT SERPL: 14.3 (ref 7–25)
CALCIUM SPEC-SCNC: 8.6 MG/DL (ref 8.6–10.5)
CHLORIDE SERPL-SCNC: 108 MMOL/L (ref 98–107)
CMV IGG SERPL IA-ACNC: 2.9 U/ML (ref 0–0.59)
CMV IGM SERPL IA-ACNC: 34.4 AU/ML (ref 0–29.9)
CO2 SERPL-SCNC: 24.4 MMOL/L (ref 22–29)
CREAT SERPL-MCNC: 0.56 MG/DL (ref 0.57–1)
DEPRECATED RDW RBC AUTO: 43.6 FL (ref 37–54)
EBV NA IGG SER IA-ACNC: <18 U/ML (ref 0–17.9)
EBV VCA IGG SER IA-ACNC: 79.5 U/ML (ref 0–17.9)
EBV VCA IGM SER IA-ACNC: >160 U/ML (ref 0–35.9)
EGFRCR SERPLBLD CKD-EPI 2021: 133.4 ML/MIN/1.73
EOSINOPHIL # BLD MANUAL: 0.09 10*3/MM3 (ref 0–0.4)
EOSINOPHIL NFR BLD MANUAL: 1 % (ref 0.3–6.2)
ERYTHROCYTE [DISTWIDTH] IN BLOOD BY AUTOMATED COUNT: 13.5 % (ref 12.3–15.4)
GLOBULIN UR ELPH-MCNC: 2.7 GM/DL
GLUCOSE SERPL-MCNC: 90 MG/DL (ref 65–99)
HCT VFR BLD AUTO: 37.7 % (ref 34–46.6)
HGB BLD-MCNC: 12.3 G/DL (ref 12–15.9)
LKM-1 AB SER-ACNC: 1.1 UNITS (ref 0–20)
LYMPHOCYTES # BLD MANUAL: 5.7 10*3/MM3 (ref 0.7–3.1)
LYMPHOCYTES NFR BLD MANUAL: 20 % (ref 5–12)
MCH RBC QN AUTO: 28.9 PG (ref 26.6–33)
MCHC RBC AUTO-ENTMCNC: 32.6 G/DL (ref 31.5–35.7)
MCV RBC AUTO: 88.5 FL (ref 79–97)
MITOCHONDRIA M2 IGG SER-ACNC: <20 UNITS (ref 0–20)
MONOCYTES # BLD: 1.73 10*3/MM3 (ref 0.1–0.9)
NEUTROPHILS # BLD AUTO: 1.04 10*3/MM3 (ref 1.7–7)
NEUTROPHILS NFR BLD MANUAL: 12 % (ref 42.7–76)
PLAT MORPH BLD: NORMAL
PLATELET # BLD AUTO: 211 10*3/MM3 (ref 140–450)
PMV BLD AUTO: 10.4 FL (ref 6–12)
POTASSIUM SERPL-SCNC: 4.5 MMOL/L (ref 3.5–5.2)
PROT SERPL-MCNC: 6.2 G/DL (ref 6–8.5)
RBC # BLD AUTO: 4.26 10*6/MM3 (ref 3.77–5.28)
RBC MORPH BLD: NORMAL
REF LAB TEST METHOD: NORMAL
REF LAB TEST METHOD: NORMAL
SCAN SLIDE: NORMAL
SERVICE CMNT-IMP: ABNORMAL
SMA IGG SER-ACNC: 8 UNITS (ref 0–19)
SODIUM SERPL-SCNC: 141 MMOL/L (ref 136–145)
TTG IGA SER-ACNC: <2 U/ML (ref 0–3)
TTG IGG SER-ACNC: 4 U/ML (ref 0–5)
VARIANT LYMPHS NFR BLD MANUAL: 6 % (ref 0–5)
VARIANT LYMPHS NFR BLD MANUAL: 60 % (ref 19.6–45.3)
WBC MORPH BLD: NORMAL
WBC NRBC COR # BLD AUTO: 8.63 10*3/MM3 (ref 3.4–10.8)

## 2025-02-25 PROCEDURE — 85025 COMPLETE CBC W/AUTO DIFF WBC: CPT | Performed by: STUDENT IN AN ORGANIZED HEALTH CARE EDUCATION/TRAINING PROGRAM

## 2025-02-25 PROCEDURE — 96361 HYDRATE IV INFUSION ADD-ON: CPT

## 2025-02-25 PROCEDURE — 99238 HOSP IP/OBS DSCHRG MGMT 30/<: CPT | Performed by: NURSE PRACTITIONER

## 2025-02-25 PROCEDURE — 85007 BL SMEAR W/DIFF WBC COUNT: CPT | Performed by: STUDENT IN AN ORGANIZED HEALTH CARE EDUCATION/TRAINING PROGRAM

## 2025-02-25 PROCEDURE — G0378 HOSPITAL OBSERVATION PER HR: HCPCS

## 2025-02-25 PROCEDURE — 80053 COMPREHEN METABOLIC PANEL: CPT | Performed by: STUDENT IN AN ORGANIZED HEALTH CARE EDUCATION/TRAINING PROGRAM

## 2025-02-25 RX ORDER — ONDANSETRON 4 MG/1
4 TABLET, FILM COATED ORAL EVERY 8 HOURS PRN
Qty: 10 TABLET | Refills: 0 | Status: CANCELLED | OUTPATIENT
Start: 2025-02-25

## 2025-02-25 NOTE — DISCHARGE SUMMARY
[facility]   DISCHARGE SUMMARY      Name:  Marisol Suh   Age:  20 y.o.  Sex:  female  :  2005  MRN:  [id number]   Visit Number:  [id number]    Admission Date:  2025  Date of Discharge:  2025  Primary Care Physician:  Provider, No Known    Important issues to note:    -Patient admitted due to elevated LFTs and hyperbilirubinemia.  During admission LFTs and bilirubin down trended.  No abdominal pain.  MRCP and ultrasound negative.  Labs pending for CMV, EBV, antimitochondrial antibody, antimicrosomal, anti-smooth muscle, antinuclear, ttG.   -Patient does have history of mononucleosis with Monospot positive.  -Patient will follow-up with GI in 8 weeks.  Advised to follow with PCP in 1 week for repeat CBC/CMP/PT and INR.  -Updated on plan of care with father present.  All questions answered.  Strict return precautions given.    Discharge Diagnoses:     Likely acute viral hepatitis  Hyperbilirubinemia  Recent history of COVID-19 and strep pharyngitis.  History of mononucleosis    Problem List:     Active Hospital Problems    Diagnosis  POA    **Hyperbilirubinemia [E80.6]  Yes      Resolved Hospital Problems   No resolved problems to display.     Presenting Problem:    Chief Complaint   Patient presents with    Abnormal Lab      Consults:     Consulting Physician(s)         Provider   Role Specialty     Sammie Guo MD      Consulting Physician Gastroenterology          Procedures Performed:        History of presenting illness/Hospital Course:    Marisol Suh is a 20-year-old woman with past medical history of menorrhagia.  Presented to [facility] ED on 2025 at direction of PCP for abnormal labs including transaminitis and hyperbilirubinemia.  She saw PCP day prior to presentation with concern for 3 months decreased appetite with some nausea and vomiting especially after eating, 15 pound weight loss over the last 12 months. She has heavy periods, regular each  month last about 5 days.  She switched her birth control summer 2024, denied any change in symptoms at that time.  Recently diagnosed over the past month with COVID and strep.     ED summary: Afebrile, vital signs stable on room air.  Blood glucose 114, alk phos 502, , , bilirubin 1.3.  Lipase 81.  hCG negative.  CBC unremarkable.  COVID/flu negative.  Acetaminophen negative.  CT ab/pelvis no acute findings.  CT ab/pelvis small complex 5 mm right hepatic hypodensity could reflect a hemangioma.  Hospitalist consulted for further medical management.  GI consulted as well.  LFTs and total bilirubin downtrending.  MRCP with subcentimeter right lobe hepatic lesion likely representing a cyst.  Ultrasound right upper quadrant unremarkable.    Vital Signs:    Temp:  [98.2 °F (36.8 °C)-98.8 °F (37.1 °C)] 98.4 °F (36.9 °C)  Heart Rate:  [53-72] 72  Resp:  [16-17] 17  BP: ()/(63-76) 109/71    Physical Exam:    General Appearance:  Alert and cooperative.  Well-appearing young adult female.   Head:  Atraumatic and normocephalic.   Eyes: Conjunctivae and sclerae normal, no icterus. No pallor.   Ears:  Ears with no abnormalities noted.   Throat: No oral lesions, no thrush, oral mucosa moist.   Neck: Supple, trachea midline, no thyromegaly.   Back:   No kyphoscoliosis present. No tenderness to palpation.   Lungs:   Breath sounds heard bilaterally equally.  No crackles or wheezing. No Pleural rub or bronchial breathing.  Unlabored on room air.   Heart:  Normal S1 and S2, no murmur, no gallop, no rub. No JVD.   Abdomen:   Normal bowel sounds, no masses, no organomegaly. Soft, nontender, nondistended, no rebound tenderness.   Extremities: Supple, no edema, no cyanosis, no clubbing.   Pulses: Pulses palpable bilaterally.   Skin: No bleeding or rash.   Neurologic: Alert and oriented x 3. No facial asymmetry. Moves all four limbs. No tremors.     Pertinent Lab Results:     Results from last 7 days   Lab Units  02/25/25 0619 02/24/25 0654 02/23/25 2003   SODIUM mmol/L 141 141 140   POTASSIUM mmol/L 4.5 4.2 3.7   CHLORIDE mmol/L 108* 105 101   CO2 mmol/L 24.4 25.9 28.2   BUN mg/dL 8 6 7   CREATININE mg/dL 0.56* 0.64 0.85   CALCIUM mg/dL 8.6 8.9 9.2   BILIRUBIN mg/dL 0.5 0.8 1.3*   ALK PHOS U/L 348* 417* 502*   ALT (SGPT) U/L 369* 498* 610*   AST (SGOT) U/L 183* 285* 394*   GLUCOSE mg/dL 90 91 114*     Results from last 7 days   Lab Units 02/25/25  0619 02/24/25  0654 02/23/25 2003   WBC 10*3/mm3 8.63 7.17 8.25   HEMOGLOBIN g/dL 12.3 13.1 14.7   HEMATOCRIT % 37.7 39.1 44.3   PLATELETS 10*3/mm3 211 191 209     Results from last 7 days   Lab Units 02/23/25  2326   INR  0.96                 Results from last 7 days   Lab Units 02/23/25 2003   LIPASE U/L 81*               Pertinent Radiology Results:    Imaging Results (All)       Procedure Component Value Units Date/Time    MRI abdomen wo contrast mrcp [[id number]] Collected: 02/24/25 0911     Updated: 02/24/25 0936    Narrative:      MRCP     HISTORY: Abdominal pain.     PROCEDURE: MRI images of the abdomen were performed. An MRCP was also  performed. 3 D Reconstruction images were also performed.     FINDINGS: Axial localization images are limited by mild to moderate  motion artifact. There is subcentimeter hypodense lesion identified  laterally in the right lobe of the liver on the CT earlier the same day  demonstrates increased T2 and decreased T1 signal, likely cyst.  Hemangiomas in the differential as well. The gallbladder is  unremarkable. The biliary tree is proper caliber. No definite filling  defects are identified. Limited images of the pancreatic duct are  unremarkable.  Stomach is mildly distended with fluid and debris.       Impression:      Unremarkable MRCP.     Suboptimal exam secondary to motion artifact.     Subcentimeter right lobe hepatic lesion likely representing a cyst as  described.     This report was signed and finalized on 2/24/2025 9:34 AM by  Rosette Hall MD.       US Abdomen Limited [[id number]] Collected: 02/24/25 0838     Updated: 02/24/25 0841    Narrative:      RIGHT UPPER QUADRANT ULTRASOUND     HISTORY: Transaminitis, hyperbilirubinemia; E80.6-Other disorders of  bilirubin metabolism     COMPARISON: None.     PROCEDURE: Ultrasound images of the right upper quadrant were obtained.     FINDINGS:  The liver parenchyma is of proper echogenicity.  There is no  focal abnormality. The gallbladder is proper size with no wall  thickening or pericholecystic fluid. There are  no gallstones. No  evidence of ductal dilatation is identified. Limited images of the  pancreas are  obscured by bowel gas. Right kidney measures 9.5 cm and  appears normal. [  ]       Impression:      Unremarkable right upper quadrant ultrasound.           This report was signed and finalized on 2/24/2025 8:39 AM by Rosette Hall MD.       CT Abdomen Pelvis With Contrast [[id number]] Collected: 02/23/25 2218     Updated: 02/23/25 2219    Narrative:      FINAL REPORT    TECHNIQUE:  null    CLINICAL HISTORY:  Nausea and vomiting, elevated liver enzyme    COMPARISON:  null    FINDINGS:  CT abdomen and pelvis with contrast    Comparison: None    Findings:    No consolidation or effusion.    Small complex 5 mm right hepatic hypodensity could reflect a hemangioma.    Gallbladder is unremarkable. No biliary dilatation.    The portal and splenic veins appear patent.    The spleen, pancreas, and adrenal glands are unremarkable.    Both kidneys enhance symmetrically without hydroureteronephrosis.    No bowel obstruction, pneumoperitoneum, or pneumatosis. The appendix is normal.    Nonaneurysmal aorta.    No enlarged abdominal or pelvic lymph nodes.    The urinary bladder and uterus are unremarkable.    Simple pelvic free fluid likely physiologic.    There are no aggressive osseous lesions.      Impression:      IMPRESSION:    No acute findings. Appendix is normal.    Authenticated and  Electronically Signed by Delvin Moore MD on  02/23/2025 10:18:57 PM            Echo:      Condition on Discharge:      Stable.    Code status during the hospital stay:    Code Status and Medical Interventions: CPR (Attempt to Resuscitate); Full Support   Ordered at: 02/24/25 1243     Level Of Support Discussed With:    Patient     Code Status (Patient has no pulse and is not breathing):    CPR (Attempt to Resuscitate)     Medical Interventions (Patient has pulse or is breathing):    Full Support     Discharge Disposition:    Home or Self Care    Discharge Medications:       Discharge Medications        Continue These Medications        Instructions Start Date   escitalopram 10 MG tablet  Commonly known as: LEXAPRO   Oral for 30 Days      norethindrone-ethinyl estradiol 1-20 MG-MCG per tablet  Commonly known as: MICROGESTIN 1/20   Oral for 21 Days      phenazopyridine 200 MG tablet  Commonly known as: PYRIDIUM   200 mg, Oral, 3 Times Daily PRN             Discharge Diet:     Diet Instructions       Diet: Gastrointestinal Diets; Fiber-Restricted, Low Irritant; Soft to Chew (NDD 3); Whole Meat; Thin (IDDSI 0)      Discharge Diet: Gastrointestinal Diets    Gastrointestinal Diet:  Fiber-Restricted  Low Irritant       Texture: Soft to Chew (NDD 3)    Soft to Chew: Whole Meat    Fluid Consistency: Thin (IDDSI 0)          Activity at Discharge:     Activity Instructions       Activity as Tolerated            Follow-up Appointments:     Follow-up Information       Sammie Guo MD Follow up in 8 week(s).    Specialty: Gastroenterology  Why: OFFICE WILL CONTACT THE PATIENT WITH APPOINTMENT DETAILS.  Contact information:  [address] [facility] [address], Medical Office [facility] [address]  [facility] [address] [phone]               Elaine Marques DO. Follow up in 1 week(s).    Specialty: Hospitalist  Why: @ 11:30am     1 week for labs  Contact information:  [address] [facility] [address] [phone]               Betina  Aurelia Prieto, APRN. Go on 4/1/2025.    Specialty: Family Medicine  Why: @ 2:15pm to establish care.  Contact information:  [place] [address] [phone]                           No future appointments.  Test Results Pending at Discharge:    Pending Results       Procedure [Order ID] Specimen - Date/Time    Anti-Smooth Muscle Antibody Titer [[id number]] Collected: 02/24/25 0654    Specimen: Blood Updated: 02/24/25 0704    Anti-microsomal Antibody [[id number]] Collected: 02/24/25 0654    Specimen: Blood Updated: 02/24/25 0704    EBV Antibody Profile [[id number]] Collected: 02/23/25 2326    Specimen: Blood Updated: 02/23/25 2330    Mitochondrial Antibodies, M2 [[id number]] Collected: 02/23/25 2326    Specimen: Blood Updated: 02/23/25 2329    Tissue Transglutaminase, IgA [[id number]] Collected: 02/24/25 0654    Specimen: Blood Updated: 02/24/25 0704    Tissue Transglutaminase, IgG [[id number]] Collected: 02/24/25 0654    Specimen: Blood Updated: 02/24/25 0704                 Melba Leyva, APRN  02/25/25  12:38 EST    Time: I spent 25 minutes on this discharge activity which included: face-to-face encounter with the patient, reviewing the data in the system, coordination of the care with the nursing staff as well as consultants, documentation, and entering orders.     Dictated utilizing Dragon dictation.

## 2025-02-25 NOTE — PLAN OF CARE
Goal Outcome Evaluation:  Plan of Care Reviewed With: patient        Progress: no change  Outcome Evaluation: VSS, adequate pain relief,labs in the AM with discharge following

## 2025-02-25 NOTE — CASE MANAGEMENT/SOCIAL WORK
Case Management Discharge Note                Selected Continued Care - Admitted Since 2/23/2025       Destination    No services have been selected for the patient.                Durable Medical Equipment    No services have been selected for the patient.                Dialysis/Infusion    No services have been selected for the patient.                Home Medical Care    No services have been selected for the patient.                Therapy    No services have been selected for the patient.                Community Resources    No services have been selected for the patient.                [facility]    No services have been selected for the patient.                    Transportation Services  Private: Car    Final Discharge Disposition Code: 01 - home or self-care

## 2025-02-25 NOTE — DISCHARGE INSTRUCTIONS
Patient to be discharged home.  CBC CMP PT/INR in 1 week time with PCP  Follow-up with GI in 8 weeks.  Diet as tolerated.  Return to emergency department for any worsening symptoms.

## 2025-03-04 ENCOUNTER — OFFICE VISIT (OUTPATIENT)
Age: 20
End: 2025-03-04

## 2025-03-04 ENCOUNTER — HOSPITAL ENCOUNTER (OUTPATIENT)
Facility: HOSPITAL | Age: 20
Discharge: HOME | End: 2025-03-04
Payer: COMMERCIAL

## 2025-03-04 VITALS
OXYGEN SATURATION: 97 % | DIASTOLIC BLOOD PRESSURE: 85 MMHG | HEIGHT: 66 IN | WEIGHT: 125 LBS | SYSTOLIC BLOOD PRESSURE: 127 MMHG | HEART RATE: 96 BPM | TEMPERATURE: 97.8 F | BODY MASS INDEX: 20.09 KG/M2

## 2025-03-04 DIAGNOSIS — Z09 HOSPITAL DISCHARGE FOLLOW-UP: ICD-10-CM

## 2025-03-04 DIAGNOSIS — R74.01 TRANSAMINITIS: ICD-10-CM

## 2025-03-04 DIAGNOSIS — R74.01 TRANSAMINITIS: Primary | ICD-10-CM

## 2025-03-04 DIAGNOSIS — K76.89 LIVER CYST: ICD-10-CM

## 2025-03-04 LAB
ALBUMIN SERPL-MCNC: 4.5 G/DL (ref 3.4–4.8)
ALBUMIN/GLOB SERPL: 1.4 {RATIO} (ref 0.8–2)
ALP SERPL-CCNC: 193 U/L (ref 25–100)
ALT SERPL-CCNC: 136 U/L (ref 4–36)
ANION GAP SERPL CALCULATED.3IONS-SCNC: 9 MMOL/L (ref 3–16)
AST SERPL-CCNC: 48 U/L (ref 8–33)
BILIRUB SERPL-MCNC: 0.4 MG/DL (ref 0.3–1.2)
BUN SERPL-MCNC: 11 MG/DL (ref 6–20)
CALCIUM SERPL-MCNC: 9.6 MG/DL (ref 8.3–10.6)
CHLORIDE SERPL-SCNC: 103 MMOL/L (ref 98–107)
CO2 SERPL-SCNC: 26 MMOL/L (ref 20–30)
CREAT SERPL-MCNC: 0.7 MG/DL (ref 0.4–1.2)
GFR SERPLBLD CREATININE-BSD FMLA CKD-EPI: >90 ML/MIN/{1.73_M2}
GLOBULIN SER CALC-MCNC: 3.3 G/DL
GLUCOSE SERPL-MCNC: 117 MG/DL (ref 74–106)
POTASSIUM SERPL-SCNC: 4.7 MMOL/L (ref 3.4–5.1)
PROT SERPL-MCNC: 7.8 G/DL (ref 6.4–8.3)
SODIUM SERPL-SCNC: 138 MMOL/L (ref 136–145)

## 2025-03-04 PROCEDURE — 80053 COMPREHEN METABOLIC PANEL: CPT

## 2025-03-04 PROCEDURE — 36415 COLL VENOUS BLD VENIPUNCTURE: CPT

## 2025-03-04 RX ORDER — NORETHINDRONE ACETATE AND ETHINYL ESTRADIOL .02; 1 MG/1; MG/1
TABLET ORAL
COMMUNITY

## 2025-03-04 SDOH — ECONOMIC STABILITY: FOOD INSECURITY: WITHIN THE PAST 12 MONTHS, YOU WORRIED THAT YOUR FOOD WOULD RUN OUT BEFORE YOU GOT MONEY TO BUY MORE.: NEVER TRUE

## 2025-03-04 SDOH — ECONOMIC STABILITY: FOOD INSECURITY: WITHIN THE PAST 12 MONTHS, THE FOOD YOU BOUGHT JUST DIDN'T LAST AND YOU DIDN'T HAVE MONEY TO GET MORE.: NEVER TRUE

## 2025-03-04 SDOH — HEALTH STABILITY: PHYSICAL HEALTH: ON AVERAGE, HOW MANY MINUTES DO YOU ENGAGE IN EXERCISE AT THIS LEVEL?: 20 MIN

## 2025-03-04 SDOH — HEALTH STABILITY: PHYSICAL HEALTH: ON AVERAGE, HOW MANY DAYS PER WEEK DO YOU ENGAGE IN MODERATE TO STRENUOUS EXERCISE (LIKE A BRISK WALK)?: 1 DAY

## 2025-03-04 ASSESSMENT — PATIENT HEALTH QUESTIONNAIRE - PHQ9
SUM OF ALL RESPONSES TO PHQ QUESTIONS 1-9: 0
SUM OF ALL RESPONSES TO PHQ QUESTIONS 1-9: 0
1. LITTLE INTEREST OR PLEASURE IN DOING THINGS: NOT AT ALL
2. FEELING DOWN, DEPRESSED OR HOPELESS: NOT AT ALL
SUM OF ALL RESPONSES TO PHQ QUESTIONS 1-9: 0
SUM OF ALL RESPONSES TO PHQ QUESTIONS 1-9: 0

## 2025-03-04 NOTE — PROGRESS NOTES
Chief Complaint   Patient presents with    Follow-Up from Hospital     Patient was discharged from the hospital last Tuesday. She is doing well. She needs to have some labs drawn.          Have you seen any other physician or provider since your last visit yes -     Have you had any other diagnostic tests since your last visit? yes - in hospital.     Have you changed or stopped any medications since your last visit? no         
cyst, hemangioma in the differential.  Can consider discontinuing OCPs.  Consider repeat abdominal imaging. Will discuss at follow-up visit          An electronic signature was used to authenticate this note.  --Liz Hull, DO

## 2025-04-01 ENCOUNTER — HOSPITAL ENCOUNTER (OUTPATIENT)
Facility: HOSPITAL | Age: 20
Discharge: HOME | End: 2025-04-01
Payer: COMMERCIAL

## 2025-04-01 ENCOUNTER — OFFICE VISIT (OUTPATIENT)
Age: 20
End: 2025-04-01
Payer: COMMERCIAL

## 2025-04-01 VITALS
HEIGHT: 66 IN | RESPIRATION RATE: 14 BRPM | OXYGEN SATURATION: 99 % | DIASTOLIC BLOOD PRESSURE: 66 MMHG | WEIGHT: 128.6 LBS | SYSTOLIC BLOOD PRESSURE: 111 MMHG | TEMPERATURE: 97.5 F | BODY MASS INDEX: 20.67 KG/M2 | HEART RATE: 69 BPM

## 2025-04-01 DIAGNOSIS — B25.1 CYTOMEGALIC INCLUSION VIRUS HEPATITIS (HCC): Primary | ICD-10-CM

## 2025-04-01 DIAGNOSIS — R74.01 TRANSAMINITIS: ICD-10-CM

## 2025-04-01 DIAGNOSIS — B25.1 CYTOMEGALIC INCLUSION VIRUS HEPATITIS (HCC): ICD-10-CM

## 2025-04-01 PROCEDURE — 83690 ASSAY OF LIPASE: CPT

## 2025-04-01 PROCEDURE — 80053 COMPREHEN METABOLIC PANEL: CPT

## 2025-04-01 PROCEDURE — 99213 OFFICE O/P EST LOW 20 MIN: CPT | Performed by: NURSE PRACTITIONER

## 2025-04-01 PROCEDURE — 85025 COMPLETE CBC W/AUTO DIFF WBC: CPT

## 2025-04-01 PROCEDURE — G8427 DOCREV CUR MEDS BY ELIG CLIN: HCPCS | Performed by: NURSE PRACTITIONER

## 2025-04-01 PROCEDURE — G8420 CALC BMI NORM PARAMETERS: HCPCS | Performed by: NURSE PRACTITIONER

## 2025-04-01 PROCEDURE — 1036F TOBACCO NON-USER: CPT | Performed by: NURSE PRACTITIONER

## 2025-04-01 SDOH — HEALTH STABILITY: PHYSICAL HEALTH: ON AVERAGE, HOW MANY MINUTES DO YOU ENGAGE IN EXERCISE AT THIS LEVEL?: 20 MIN

## 2025-04-01 SDOH — HEALTH STABILITY: PHYSICAL HEALTH: ON AVERAGE, HOW MANY DAYS PER WEEK DO YOU ENGAGE IN MODERATE TO STRENUOUS EXERCISE (LIKE A BRISK WALK)?: 2 DAYS

## 2025-04-01 ASSESSMENT — ENCOUNTER SYMPTOMS
ALLERGIC/IMMUNOLOGIC NEGATIVE: 1
GASTROINTESTINAL NEGATIVE: 1
EYES NEGATIVE: 1
RESPIRATORY NEGATIVE: 1

## 2025-04-01 NOTE — PROGRESS NOTES
Chief Complaint   Patient presents with    Discuss Labs       Have you seen any other physician or provider since your last visit no    Have you had any other diagnostic tests since your last visit? yes - labs    Have you changed or stopped any medications since your last visit? no        SUBJECTIVE:    Patient ID:Hope Finch is a 20 y.o. female.    Chief Complaint   Patient presents with    Discuss Labs     HPI:  Patient is here to follow up on her recent visit with Dr Hull. She was admitted at [facility] for Hyperbilirubinemia. She has been doing well and denies any abdominal pain bloating or fever.  No changes in bowel or bladder issues.  No fever or chills.   History of Present Illness        Patient's medications, allergies, past medical, surgical, social and family histories were reviewed and updated as appropriate.          Review of Systems   Constitutional: Negative.    HENT: Negative.     Eyes: Negative.    Respiratory: Negative.     Cardiovascular: Negative.    Gastrointestinal: Negative.    Endocrine: Negative.    Genitourinary: Negative.    Musculoskeletal: Negative.    Skin: Negative.    Allergic/Immunologic: Negative.    Neurological: Negative.    Hematological: Negative.    Psychiatric/Behavioral: Negative.         OBJECTIVE:  /66 (BP Site: Right Upper Arm, Patient Position: Sitting, BP Cuff Size: Medium Adult)   Pulse 69   Temp 97.5 °F (36.4 °C) (Temporal)   Resp 14   Ht 1.676 m (5' 5.98\")   Wt 58.3 kg (128 lb 9.6 oz)   SpO2 99% Comment: room air  BMI 20.77 kg/m²    Physical Exam  Vitals and nursing note reviewed.   Constitutional:       Appearance: She is well-developed.   HENT:      Head: Normocephalic and atraumatic.   Eyes:      Conjunctiva/sclera: Conjunctivae normal.      Pupils: Pupils are equal, round, and reactive to light.   Neck:      Thyroid: No thyromegaly.      Vascular: No JVD.   Cardiovascular:      Rate and Rhythm: Normal rate and regular rhythm.      Heart

## 2025-04-01 NOTE — PATIENT INSTRUCTIONS
patches by folding them in half so that the sticky sides meet, and then flushing them down a toilet. They should not be placed in the household trash where children or pets can find them.  If you have any questions, ask your provider or pharmacist for more information.   Be sure to keep all appointments for provider visits or tests. We are committed to providing you with the best care possible.   In order to help us achieve these goals please remember to bring all medications, herbal products, and over the counter supplements with you to each visit.     If your provider has ordered testing for you, please be sure to follow up with our office if you have not received results within 7 days after the testing took place.     *If you receive a survey after visiting one of our offices, please take time to share your experience concerning your physician office visit. These surveys are confidential and no health information about you is shared.  We are eager to improve for you and we are counting on your feedback to help make that happen. Thank you for enrolling in [employer]. Please follow the instructions below to securely access your online medical record. [employer] allows you to send messages to your doctor, view your test results, renew your prescriptions, schedule appointments, and more.     How Do I Sign Up?  In your Internet browser, go to [email or web address]  Click on the Sign Up Now link in the Sign In box. You will see the New Member Sign Up page.  Enter your [employer] Access Code exactly as it appears below. You will not need to use this code after you’ve completed the sign-up process. If you do not sign up before the expiration date, you must request a new code.  [employer] Access Code: Activation code not generated  Current [employer] Status: Active    Enter your Social Security Number ([id number]) and Date of Birth (mm/dd/yyyy) as indicated and click Submit. You will be taken to the next

## 2025-04-02 LAB
ALBUMIN SERPL-MCNC: 4.8 G/DL (ref 3.4–4.8)
ALBUMIN/GLOB SERPL: 1.6 {RATIO} (ref 0.8–2)
ALP SERPL-CCNC: 99 U/L (ref 25–100)
ALT SERPL-CCNC: 20 U/L (ref 4–36)
ANION GAP SERPL CALCULATED.3IONS-SCNC: 11 MMOL/L (ref 3–16)
AST SERPL-CCNC: 25 U/L (ref 8–33)
BASOPHILS # BLD: 0.1 K/UL (ref 0–0.1)
BASOPHILS NFR BLD: 0.9 %
BILIRUB SERPL-MCNC: 0.3 MG/DL (ref 0.3–1.2)
BUN SERPL-MCNC: 13 MG/DL (ref 6–20)
CALCIUM SERPL-MCNC: 9.8 MG/DL (ref 8.3–10.6)
CHLORIDE SERPL-SCNC: 103 MMOL/L (ref 98–107)
CO2 SERPL-SCNC: 26 MMOL/L (ref 20–30)
CREAT SERPL-MCNC: 0.6 MG/DL (ref 0.4–1.2)
EOSINOPHIL # BLD: 0.1 K/UL (ref 0–0.4)
EOSINOPHIL NFR BLD: 0.7 %
ERYTHROCYTE [DISTWIDTH] IN BLOOD BY AUTOMATED COUNT: 12.3 % (ref 11–16)
GFR SERPLBLD CREATININE-BSD FMLA CKD-EPI: >90 ML/MIN/{1.73_M2}
GLOBULIN SER CALC-MCNC: 3 G/DL
GLUCOSE SERPL-MCNC: 82 MG/DL (ref 74–106)
HCT VFR BLD AUTO: 43.2 % (ref 37–47)
HGB BLD-MCNC: 14.1 G/DL (ref 11.5–16.5)
IMM GRANULOCYTES # BLD: 0 K/UL
IMM GRANULOCYTES NFR BLD: 0.1 % (ref 0–5)
LIPASE SERPL-CCNC: 57 U/L (ref 5.6–51.3)
LYMPHOCYTES # BLD: 3 K/UL (ref 1.5–4)
LYMPHOCYTES NFR BLD: 30.3 %
MCH RBC QN AUTO: 28.6 PG (ref 27–32)
MCHC RBC AUTO-ENTMCNC: 32.6 G/DL (ref 31–35)
MCV RBC AUTO: 87.6 FL (ref 80–100)
MONOCYTES # BLD: 0.7 K/UL (ref 0.2–0.8)
MONOCYTES NFR BLD: 7.1 %
NEUTROPHILS # BLD: 6 K/UL (ref 2–7.5)
NEUTS SEG NFR BLD: 60.9 %
PLATELET # BLD AUTO: 291 K/UL (ref 150–400)
PMV BLD AUTO: 11 FL (ref 6–10)
POTASSIUM SERPL-SCNC: 4.8 MMOL/L (ref 3.4–5.1)
PROT SERPL-MCNC: 7.8 G/DL (ref 6.4–8.3)
RBC # BLD AUTO: 4.93 M/UL (ref 3.8–5.8)
SODIUM SERPL-SCNC: 140 MMOL/L (ref 136–145)
WBC # BLD AUTO: 9.8 K/UL (ref 4–11)

## 2025-05-04 ENCOUNTER — RESULTS FOLLOW-UP (OUTPATIENT)
Age: 20
End: 2025-05-04

## 2025-05-28 ENCOUNTER — OFFICE VISIT (OUTPATIENT)
Age: 20
End: 2025-05-28
Payer: COMMERCIAL

## 2025-05-28 VITALS
OXYGEN SATURATION: 97 % | WEIGHT: 122 LBS | BODY MASS INDEX: 19.7 KG/M2 | SYSTOLIC BLOOD PRESSURE: 117 MMHG | TEMPERATURE: 98.6 F | HEART RATE: 84 BPM | DIASTOLIC BLOOD PRESSURE: 80 MMHG

## 2025-05-28 DIAGNOSIS — Z86.19 HISTORY OF ACUTE HEPATITIS: ICD-10-CM

## 2025-05-28 DIAGNOSIS — Z30.41 ORAL CONTRACEPTIVE USE: ICD-10-CM

## 2025-05-28 DIAGNOSIS — F41.9 ANXIETY: Primary | ICD-10-CM

## 2025-05-28 DIAGNOSIS — K76.89 LIVER CYST: ICD-10-CM

## 2025-05-28 PROCEDURE — G8420 CALC BMI NORM PARAMETERS: HCPCS | Performed by: INTERNAL MEDICINE

## 2025-05-28 PROCEDURE — G8427 DOCREV CUR MEDS BY ELIG CLIN: HCPCS | Performed by: INTERNAL MEDICINE

## 2025-05-28 PROCEDURE — 99214 OFFICE O/P EST MOD 30 MIN: CPT | Performed by: INTERNAL MEDICINE

## 2025-05-28 PROCEDURE — 1036F TOBACCO NON-USER: CPT | Performed by: INTERNAL MEDICINE

## 2025-05-28 RX ORDER — ESCITALOPRAM OXALATE 20 MG/1
10 TABLET ORAL DAILY
Qty: 45 TABLET | Refills: 1 | Status: SHIPPED | OUTPATIENT
Start: 2025-05-28

## 2025-05-28 RX ORDER — ESCITALOPRAM OXALATE 20 MG/1
10 TABLET ORAL DAILY
Qty: 45 TABLET | Refills: 1 | Status: SHIPPED | OUTPATIENT
Start: 2025-05-28 | End: 2025-05-28

## 2025-05-28 NOTE — PROGRESS NOTES
Chief Complaint   Patient presents with    Anxiety     Patient would like to get restarted on her anxiety medication. She has recently had more issues and feels that she may need it.          Have you seen any other physician or provider since your last visit no    Have you had any other diagnostic tests since your last visit? no    Have you changed or stopped any medications since your last visit? no

## 2025-05-28 NOTE — PROGRESS NOTES
Hope Finch (:  2005) is a 20 y.o. female,Established patient, here for evaluation of the following chief complaint(s):  Anxiety (Patient would like to get restarted on her anxiety medication. She has recently had more issues and feels that she may need it. /)      ASSESSMENT/PLAN:  Assessment & Plan  Anxiety   She reports increased anxiety over the past 2-3 months, exacerbated by quitting her job. She will start Lexapro 10 mg daily. If there is no improvement after 1 week, the dosage may be increased to 20 mg daily. She is advised to monitor for any suicidal thoughts and to reach out if they occur. The prescription will be sent to [facility].  Liver cyst  A liver cyst was identified during a previous hospitalization, potentially a hemangioma likely caused by birth control pills. She is advised to follow up with a gastroenterologist to evaluate the cyst and discuss the continuation of birth control pills.  History of acute hepatitis  Resolved, no further work-up needed at this time.  Oral contraceptive use  On Micogestin     Return in about 1 month (around 2025).    SUBJECTIVE/OBJECTIVE:  History of Present Illness  The patient is a 20-year-old female who presents for evaluation of anxiety.    Her last visit in 2025 was a hospital follow-up after she had been admitted due to gastrointestinal symptoms and found to have acute viral hepatitis. At the time of discharge, her liver transaminases and bilirubin were trending down. She was found to be positive for mononucleosis and cytomegalovirus (CMV) with testing completed while she was in the hospital but had a know hx of mono. Antimitochondrial antibody, antimicrosomal and anti-smooth muscle antibodies, antinuclear antibody, and tissue transglutaminase (TTG) were all negative. She was scheduled to follow up with Gastroenterology in 8 weeks but cancelled her appointment due to normalized labs.  Labs obtained in March showed

## 2025-07-05 ENCOUNTER — PATIENT MESSAGE (OUTPATIENT)
Age: 20
End: 2025-07-05

## 2025-07-08 RX ORDER — SERTRALINE HYDROCHLORIDE 25 MG/1
25 TABLET, FILM COATED ORAL DAILY
Qty: 30 TABLET | Refills: 3 | Status: SHIPPED | OUTPATIENT
Start: 2025-07-08

## 2025-07-23 ENCOUNTER — TELEMEDICINE (OUTPATIENT)
Age: 20
End: 2025-07-23
Payer: COMMERCIAL

## 2025-07-23 DIAGNOSIS — F41.9 ANXIETY: Primary | ICD-10-CM

## 2025-07-23 PROCEDURE — 1036F TOBACCO NON-USER: CPT | Performed by: INTERNAL MEDICINE

## 2025-07-23 PROCEDURE — G8420 CALC BMI NORM PARAMETERS: HCPCS | Performed by: INTERNAL MEDICINE

## 2025-07-23 PROCEDURE — 99214 OFFICE O/P EST MOD 30 MIN: CPT | Performed by: INTERNAL MEDICINE

## 2025-07-23 PROCEDURE — G8428 CUR MEDS NOT DOCUMENT: HCPCS | Performed by: INTERNAL MEDICINE

## 2025-07-23 RX ORDER — DULOXETIN HYDROCHLORIDE 30 MG/1
30 CAPSULE, DELAYED RELEASE ORAL DAILY
Qty: 30 CAPSULE | Refills: 1 | Status: SHIPPED | OUTPATIENT
Start: 2025-07-23

## 2025-07-23 NOTE — PROGRESS NOTES
Hope Finch, was evaluated through a synchronous (real-time) audio-video encounter. The patient (or guardian if applicable) is aware that this is a billable service, which includes applicable co-pays. This Virtual Visit was conducted with patient's (and/or legal guardian's) consent. Patient identification was verified, and a caregiver was present when appropriate.   The patient was located at Home: [address] [facility] [address]  Provider was located at Facility (Appt Dept): [address] [place] [address]  Confirm you are appropriately licensed, registered, or certified to deliver care in the state where the patient is located as indicated above. If you are not or unsure, please re-schedule the visit: Yes, I confirm.     Hope Finch (:  2005) is a Established patient, presenting virtually for evaluation of the following:      Below is the assessment and plan developed based on review of pertinent history, physical exam, labs, studies, and medications.     Assessment & Plan  Anxiety  - Symptoms suggest anxiety with some obsessive-compulsive tendencies, but not a definitive diagnosis of OCD.  - Physical exam findings include a reported increase in anxiety and a twitch in the arm; Zoloft was not well tolerated.  - Discussed discontinuing Zoloft and initiating Cymbalta at a lower dose of 30 mg, with the possibility of increasing to 60 mg after 2 weeks if tolerated. Potential side effects of Cymbalta, including lightheadedness and dizziness upon standing, were reviewed.  - Referral for cognitive behavioral therapy was made to further assess her condition and provide appropriate treatment.  Orders:    DULoxetine (CYMBALTA) 30 MG extended release capsule; Take 1 capsule by mouth daily    [facility], Behavioral [facility], [place]      Return in about 2 months (around 2025).       Subjective   History of Present Illness  The patient is a 20-year-old female

## 2025-09-04 ENCOUNTER — SOCIAL WORK (OUTPATIENT)
Age: 20
End: 2025-09-04